# Patient Record
Sex: MALE | Race: WHITE | Employment: UNEMPLOYED | URBAN - NONMETROPOLITAN AREA
[De-identification: names, ages, dates, MRNs, and addresses within clinical notes are randomized per-mention and may not be internally consistent; named-entity substitution may affect disease eponyms.]

---

## 2020-04-06 ENCOUNTER — HOSPITAL ENCOUNTER (EMERGENCY)
Age: 30
Discharge: HOME | End: 2020-04-07
Payer: COMMERCIAL

## 2020-04-06 LAB
ACETAMINOPHEN LEVEL: < 5 UG/ML (ref 0–20)
ALBUMIN SERPL-MCNC: 3.7 G/DL (ref 3.5–5.1)
ALP BLD-CCNC: 118 U/L (ref 38–126)
ALT SERPL-CCNC: 19 U/L (ref 11–66)
AMPHETAMINE+METHAMPHETAMINE URINE SCREEN: POSITIVE
ANION GAP SERPL CALCULATED.3IONS-SCNC: 12 MEQ/L (ref 8–16)
AST SERPL-CCNC: 26 U/L (ref 5–40)
BACTERIA: ABNORMAL /HPF
BARBITURATE QUANTITATIVE URINE: NEGATIVE
BASOPHILS # BLD: 0.2 %
BASOPHILS ABSOLUTE: 0 THOU/MM3 (ref 0–0.1)
BENZODIAZEPINE QUANTITATIVE URINE: NEGATIVE
BILIRUB SERPL-MCNC: 0.2 MG/DL (ref 0.3–1.2)
BILIRUBIN DIRECT: < 0.2 MG/DL (ref 0–0.3)
BILIRUBIN URINE: NEGATIVE
BLOOD, URINE: NEGATIVE
BUN BLDV-MCNC: 15 MG/DL (ref 7–22)
CALCIUM SERPL-MCNC: 8.7 MG/DL (ref 8.5–10.5)
CANNABINOID QUANTITATIVE URINE: POSITIVE
CASTS 2: ABNORMAL /LPF
CASTS UA: ABNORMAL /LPF
CHARACTER, URINE: CLEAR
CHLORIDE BLD-SCNC: 100 MEQ/L (ref 98–111)
CO2: 22 MEQ/L (ref 23–33)
COCAINE METABOLITE QUANTITATIVE URINE: NEGATIVE
COLOR: YELLOW
CREAT SERPL-MCNC: 0.7 MG/DL (ref 0.4–1.2)
CRYSTALS, UA: ABNORMAL
EOSINOPHIL # BLD: 0.2 %
EOSINOPHILS ABSOLUTE: 0 THOU/MM3 (ref 0–0.4)
EPITHELIAL CELLS, UA: ABNORMAL /HPF
ERYTHROCYTE [DISTWIDTH] IN BLOOD BY AUTOMATED COUNT: 12.3 % (ref 11.5–14.5)
ERYTHROCYTE [DISTWIDTH] IN BLOOD BY AUTOMATED COUNT: 42 FL (ref 35–45)
ETHYL ALCOHOL, SERUM: < 0.01 %
GFR SERPL CREATININE-BSD FRML MDRD: > 90 ML/MIN/1.73M2
GLUCOSE BLD-MCNC: 141 MG/DL (ref 70–108)
GLUCOSE URINE: NEGATIVE MG/DL
HCT VFR BLD CALC: 41.4 % (ref 42–52)
HEMOGLOBIN: 13.7 GM/DL (ref 14–18)
IMMATURE GRANS (ABS): 0.04 THOU/MM3 (ref 0–0.07)
IMMATURE GRANULOCYTES: 0.4 %
KETONES, URINE: NEGATIVE
LEUKOCYTE ESTERASE, URINE: ABNORMAL
LYMPHOCYTES # BLD: 8.9 %
LYMPHOCYTES ABSOLUTE: 0.9 THOU/MM3 (ref 1–4.8)
MCH RBC QN AUTO: 30.6 PG (ref 26–33)
MCHC RBC AUTO-ENTMCNC: 33.1 GM/DL (ref 32.2–35.5)
MCV RBC AUTO: 92.6 FL (ref 80–94)
MISCELLANEOUS 2: ABNORMAL
MONOCYTES # BLD: 5.7 %
MONOCYTES ABSOLUTE: 0.6 THOU/MM3 (ref 0.4–1.3)
NITRITE, URINE: NEGATIVE
NUCLEATED RED BLOOD CELLS: 0 /100 WBC
OPIATES, URINE: NEGATIVE
OSMOLALITY CALCULATION: 271.4 MOSMOL/KG (ref 275–300)
OXYCODONE: NEGATIVE
PH UA: 7.5 (ref 5–9)
PHENCYCLIDINE QUANTITATIVE URINE: NEGATIVE
PLATELET # BLD: 289 THOU/MM3 (ref 130–400)
PMV BLD AUTO: 9.5 FL (ref 9.4–12.4)
POTASSIUM SERPL-SCNC: 4.1 MEQ/L (ref 3.5–5.2)
PROTEIN UA: NEGATIVE
RBC # BLD: 4.47 MILL/MM3 (ref 4.7–6.1)
RBC URINE: ABNORMAL /HPF
RENAL EPITHELIAL, UA: ABNORMAL
SALICYLATE, SERUM: < 0.3 MG/DL (ref 2–10)
SEG NEUTROPHILS: 84.6 %
SEGMENTED NEUTROPHILS ABSOLUTE COUNT: 8.6 THOU/MM3 (ref 1.8–7.7)
SODIUM BLD-SCNC: 134 MEQ/L (ref 135–145)
SPECIFIC GRAVITY, URINE: 1.02 (ref 1–1.03)
TOTAL PROTEIN: 6.8 G/DL (ref 6.1–8)
UROBILINOGEN, URINE: 1 EU/DL (ref 0–1)
WBC # BLD: 10.2 THOU/MM3 (ref 4.8–10.8)
WBC UA: ABNORMAL /HPF
YEAST: ABNORMAL

## 2020-04-06 PROCEDURE — 99283 EMERGENCY DEPT VISIT LOW MDM: CPT

## 2020-04-06 PROCEDURE — 84443 ASSAY THYROID STIM HORMONE: CPT

## 2020-04-06 PROCEDURE — 36415 COLL VENOUS BLD VENIPUNCTURE: CPT

## 2020-04-06 PROCEDURE — G0480 DRUG TEST DEF 1-7 CLASSES: HCPCS

## 2020-04-06 PROCEDURE — 82248 BILIRUBIN DIRECT: CPT

## 2020-04-06 PROCEDURE — 80053 COMPREHEN METABOLIC PANEL: CPT

## 2020-04-06 PROCEDURE — 87086 URINE CULTURE/COLONY COUNT: CPT

## 2020-04-06 PROCEDURE — 81001 URINALYSIS AUTO W/SCOPE: CPT

## 2020-04-06 PROCEDURE — 85025 COMPLETE CBC W/AUTO DIFF WBC: CPT

## 2020-04-06 PROCEDURE — 80307 DRUG TEST PRSMV CHEM ANLYZR: CPT

## 2020-04-06 ASSESSMENT — ENCOUNTER SYMPTOMS
ABDOMINAL PAIN: 0
SHORTNESS OF BREATH: 0
SORE THROAT: 0
COUGH: 0
DIARRHEA: 0
RHINORRHEA: 0
VOMITING: 0
BACK PAIN: 0
NAUSEA: 0

## 2020-04-06 ASSESSMENT — PAIN DESCRIPTION - ORIENTATION: ORIENTATION: LEFT;RIGHT

## 2020-04-06 ASSESSMENT — PAIN DESCRIPTION - LOCATION: LOCATION: FOOT

## 2020-04-06 ASSESSMENT — PAIN SCALES - GENERAL: PAINLEVEL_OUTOF10: 6

## 2020-04-07 VITALS
HEART RATE: 85 BPM | DIASTOLIC BLOOD PRESSURE: 73 MMHG | HEIGHT: 72 IN | SYSTOLIC BLOOD PRESSURE: 122 MMHG | TEMPERATURE: 97.5 F | OXYGEN SATURATION: 97 % | BODY MASS INDEX: 24.38 KG/M2 | WEIGHT: 180 LBS | RESPIRATION RATE: 17 BRPM

## 2020-04-07 LAB — TSH SERPL DL<=0.05 MIU/L-ACNC: 1.03 UIU/ML (ref 0.4–4.2)

## 2020-04-07 NOTE — ED NOTES
Pt resting in bed at this time and appears to be resting comfortably. Pt denies needs. Pt respirations easy and unlabored. Mother updated.       Augustine Kapoor RN  04/07/20 0003

## 2020-04-07 NOTE — ED NOTES
[facility] called at this time. [facility] allowed to [facility] to [facility] once patient is medically cleared.       Stefanie Del Angel RN  04/06/20 9664

## 2020-04-07 NOTE — ED NOTES
Pt urine sent to lab at this time. Pt updated on POC. Pt denies further needs.       Monae Wyman RN  04/06/20 3526

## 2020-04-07 NOTE — ED TRIAGE NOTES
Pt to ED from home presenting for medical clearance after going to [facility] after an overdose. Pt states that today around an hour-two hours ago he overdosed on Meth and Heroin, police and squad gave him narcan and he refused medical treatment to the hospital. Pt states he immediately went to [facility]. [facility] sent pt to ED for medical clearance. Pt alert and oriented x4. Pt vital signs stable and respirations unlabored. Pt has prescription for Suboxone but has not taken due to relapsing about three weeks ago. Pt has scabs all over arms due to injections. Pt states he has pain 6/10 on the bottom of his feet due to not wearing proper shoes. Pt denies travel or being around anyone sick. Level C being paged out at this time. Pt denies wanting to harm self or others. Patient placed in safe room that is ligature resistant with continuous monitoring in place. Provider notified, requested an assessment by behavioral health . Patient belongings secured in a locked lockers outside of the room.

## 2020-04-08 LAB — URINE CULTURE REFLEX: NORMAL

## 2020-04-14 ENCOUNTER — HOSPITAL ENCOUNTER (OUTPATIENT)
Age: 30
Discharge: HOME | End: 2020-04-14
Payer: COMMERCIAL

## 2020-04-14 ENCOUNTER — HOSPITAL ENCOUNTER (OUTPATIENT)
Age: 30
Setting detail: SPECIMEN
Discharge: HOME | End: 2020-04-14
Payer: COMMERCIAL

## 2020-04-14 LAB
ALBUMIN SERPL-MCNC: 4.5 G/DL (ref 3.5–5.1)
ALP BLD-CCNC: 118 U/L (ref 38–126)
ALT SERPL-CCNC: 58 U/L (ref 11–66)
AST SERPL-CCNC: 39 U/L (ref 5–40)
BILIRUB SERPL-MCNC: 0.4 MG/DL (ref 0.3–1.2)
BILIRUBIN DIRECT: < 0.2 MG/DL (ref 0–0.3)
HEPATITIS B CORE IGM ANTIBODY: NEGATIVE
HEPATITIS B SURFACE ANTIGEN: NEGATIVE
HEPATITIS C ANTIBODY: NEGATIVE
TOTAL PROTEIN: 7.9 G/DL (ref 6.1–8)

## 2020-04-14 PROCEDURE — 80076 HEPATIC FUNCTION PANEL: CPT

## 2020-04-14 PROCEDURE — 36415 COLL VENOUS BLD VENIPUNCTURE: CPT

## 2020-04-14 PROCEDURE — 86803 HEPATITIS C AB TEST: CPT

## 2020-04-14 PROCEDURE — 86705 HEP B CORE ANTIBODY IGM: CPT

## 2020-04-14 PROCEDURE — 87340 HEPATITIS B SURFACE AG IA: CPT

## 2020-04-14 PROCEDURE — 86592 SYPHILIS TEST NON-TREP QUAL: CPT

## 2020-04-14 PROCEDURE — 86694 HERPES SIMPLEX NES ANTBDY: CPT

## 2020-04-15 LAB
C. TRACHOMATIS DNA ,URINE: NEGATIVE
N. GONORRHOEAE DNA, URINE: NEGATIVE
RPR: NONREACTIVE
SOURCE: NORMAL
SPECIMEN DESCRIPTION: NORMAL
TRICHOMONAS VAGINALI, MOLECULAR: NEGATIVE

## 2020-04-16 LAB — HERPES TYPE I/II IGG COMBINED: 14.4 IV

## 2020-04-17 LAB — HERPES TYPE 1/2 IGM COMBINED: 1.08 IV

## 2020-06-03 ENCOUNTER — TELEPHONE (OUTPATIENT)
Dept: INTERNAL MEDICINE CLINIC | Age: 30
End: 2020-06-03

## 2020-06-03 NOTE — TELEPHONE ENCOUNTER
Patient completed a NP Screening for the [facility]. Dr. Klein Seen has requested to view the patient record from [facility] before he make a decision on a appointment. I called patient and he stated will be coming in the office to sign a release. Release form is at the .

## 2022-01-01 ENCOUNTER — HOSPITAL ENCOUNTER (EMERGENCY)
Age: 32
Discharge: HOME | End: 2022-01-01

## 2022-01-01 VITALS
OXYGEN SATURATION: 95 % | DIASTOLIC BLOOD PRESSURE: 71 MMHG | SYSTOLIC BLOOD PRESSURE: 117 MMHG | TEMPERATURE: 98.3 F | WEIGHT: 200 LBS | BODY MASS INDEX: 27.09 KG/M2 | HEART RATE: 94 BPM | HEIGHT: 72 IN | RESPIRATION RATE: 16 BRPM

## 2022-01-01 DIAGNOSIS — R10.9 NONSPECIFIC ABDOMINAL PAIN: Primary | ICD-10-CM

## 2022-01-01 LAB
ALBUMIN SERPL-MCNC: 5.1 G/DL (ref 3.5–5.1)
ALP BLD-CCNC: 99 U/L (ref 38–126)
ALT SERPL-CCNC: 16 U/L (ref 11–66)
AMPHETAMINE+METHAMPHETAMINE URINE SCREEN: POSITIVE
ANION GAP SERPL CALCULATED.3IONS-SCNC: 14 MEQ/L (ref 8–16)
AST SERPL-CCNC: 37 U/L (ref 5–40)
BACTERIA: ABNORMAL
BARBITURATE QUANTITATIVE URINE: NEGATIVE
BASOPHILS # BLD: 0.1 %
BASOPHILS ABSOLUTE: 0 THOU/MM3 (ref 0–0.1)
BENZODIAZEPINE QUANTITATIVE URINE: NEGATIVE
BILIRUB SERPL-MCNC: 1.1 MG/DL (ref 0.3–1.2)
BILIRUBIN DIRECT: < 0.2 MG/DL (ref 0–0.3)
BILIRUBIN URINE: NEGATIVE
BLOOD, URINE: NEGATIVE
BUN BLDV-MCNC: 17 MG/DL (ref 7–22)
CALCIUM SERPL-MCNC: 9.7 MG/DL (ref 8.5–10.5)
CANNABINOID QUANTITATIVE URINE: POSITIVE
CASTS: ABNORMAL /LPF
CASTS: ABNORMAL /LPF
CHARACTER, URINE: CLEAR
CHLORIDE BLD-SCNC: 103 MEQ/L (ref 98–111)
CO2: 24 MEQ/L (ref 23–33)
COCAINE METABOLITE QUANTITATIVE URINE: NEGATIVE
COLOR: ABNORMAL
CREAT SERPL-MCNC: 1 MG/DL (ref 0.4–1.2)
CRYSTALS: ABNORMAL
EKG ATRIAL RATE: 116 BPM
EKG P AXIS: 59 DEGREES
EKG P-R INTERVAL: 162 MS
EKG Q-T INTERVAL: 334 MS
EKG QRS DURATION: 92 MS
EKG QTC CALCULATION (BAZETT): 464 MS
EKG R AXIS: 22 DEGREES
EKG T AXIS: 47 DEGREES
EKG VENTRICULAR RATE: 116 BPM
EOSINOPHIL # BLD: 0.1 %
EOSINOPHILS ABSOLUTE: 0 THOU/MM3 (ref 0–0.4)
EPITHELIAL CELLS, UA: ABNORMAL /HPF
ERYTHROCYTE [DISTWIDTH] IN BLOOD BY AUTOMATED COUNT: 12.6 % (ref 11.5–14.5)
ERYTHROCYTE [DISTWIDTH] IN BLOOD BY AUTOMATED COUNT: 42.5 FL (ref 35–45)
ETHYL ALCOHOL, SERUM: < 0.01 %
GFR SERPL CREATININE-BSD FRML MDRD: 88 ML/MIN/1.73M2
GLUCOSE BLD-MCNC: 107 MG/DL (ref 70–108)
GLUCOSE, URINE: NEGATIVE MG/DL
HCT VFR BLD CALC: 45.6 % (ref 42–52)
HEMOGLOBIN: 16 GM/DL (ref 14–18)
IMMATURE GRANS (ABS): 0.01 THOU/MM3 (ref 0–0.07)
IMMATURE GRANULOCYTES: 0.1 %
KETONES, URINE: 80
LEUKOCYTE ESTERASE, URINE: NEGATIVE
LIPASE: 54.6 U/L (ref 5.6–51.3)
LYMPHOCYTES # BLD: 21.8 %
LYMPHOCYTES ABSOLUTE: 1.6 THOU/MM3 (ref 1–4.8)
MCH RBC QN AUTO: 32.3 PG (ref 26–33)
MCHC RBC AUTO-ENTMCNC: 35.1 GM/DL (ref 32.2–35.5)
MCV RBC AUTO: 92.1 FL (ref 80–94)
MISCELLANEOUS LAB TEST RESULT: ABNORMAL
MONOCYTES # BLD: 10.7 %
MONOCYTES ABSOLUTE: 0.8 THOU/MM3 (ref 0.4–1.3)
NITRITE, URINE: NEGATIVE
NUCLEATED RED BLOOD CELLS: 0 /100 WBC
OPIATES, URINE: NEGATIVE
OSMOLALITY CALCULATION: 283.3 MOSMOL/KG (ref 275–300)
OXYCODONE: NEGATIVE
PH UA: 5.5 (ref 5–9)
PHENCYCLIDINE QUANTITATIVE URINE: NEGATIVE
PLATELET # BLD: 207 THOU/MM3 (ref 130–400)
PMV BLD AUTO: 9.6 FL (ref 9.4–12.4)
POTASSIUM SERPL-SCNC: 3.3 MEQ/L (ref 3.5–5.2)
PROTEIN UA: 30 MG/DL
RBC # BLD: 4.95 MILL/MM3 (ref 4.7–6.1)
RBC URINE: ABNORMAL /HPF
RENAL EPITHELIAL, UA: ABNORMAL
SEG NEUTROPHILS: 67.2 %
SEGMENTED NEUTROPHILS ABSOLUTE COUNT: 4.9 THOU/MM3 (ref 1.8–7.7)
SODIUM BLD-SCNC: 141 MEQ/L (ref 135–145)
SPECIFIC GRAVITY UA: > 1.03 (ref 1–1.03)
TOTAL PROTEIN: 7.8 G/DL (ref 6.1–8)
UROBILINOGEN, URINE: 1 EU/DL (ref 0–1)
WBC # BLD: 7.3 THOU/MM3 (ref 4.8–10.8)
WBC UA: ABNORMAL /HPF
YEAST: ABNORMAL

## 2022-01-01 PROCEDURE — 82077 ASSAY SPEC XCP UR&BREATH IA: CPT

## 2022-01-01 PROCEDURE — 2580000003 HC RX 258: Performed by: NURSE PRACTITIONER

## 2022-01-01 PROCEDURE — 96360 HYDRATION IV INFUSION INIT: CPT

## 2022-01-01 PROCEDURE — 99284 EMERGENCY DEPT VISIT MOD MDM: CPT

## 2022-01-01 PROCEDURE — 80307 DRUG TEST PRSMV CHEM ANLYZR: CPT

## 2022-01-01 PROCEDURE — 83690 ASSAY OF LIPASE: CPT

## 2022-01-01 PROCEDURE — 81001 URINALYSIS AUTO W/SCOPE: CPT

## 2022-01-01 PROCEDURE — 93005 ELECTROCARDIOGRAM TRACING: CPT | Performed by: NURSE PRACTITIONER

## 2022-01-01 PROCEDURE — 85025 COMPLETE CBC W/AUTO DIFF WBC: CPT

## 2022-01-01 PROCEDURE — 82248 BILIRUBIN DIRECT: CPT

## 2022-01-01 PROCEDURE — 80053 COMPREHEN METABOLIC PANEL: CPT

## 2022-01-01 PROCEDURE — 6370000000 HC RX 637 (ALT 250 FOR IP): Performed by: NURSE PRACTITIONER

## 2022-01-01 RX ORDER — OMEPRAZOLE 20 MG/1
20 CAPSULE, DELAYED RELEASE ORAL
Qty: 30 CAPSULE | Refills: 0 | Status: ON HOLD | OUTPATIENT
Start: 2022-01-01 | End: 2022-01-07 | Stop reason: HOSPADM

## 2022-01-01 RX ORDER — PANTOPRAZOLE SODIUM 40 MG/1
40 TABLET, DELAYED RELEASE ORAL ONCE
Status: COMPLETED | OUTPATIENT
Start: 2022-01-01 | End: 2022-01-01

## 2022-01-01 RX ORDER — 0.9 % SODIUM CHLORIDE 0.9 %
1000 INTRAVENOUS SOLUTION INTRAVENOUS ONCE
Status: COMPLETED | OUTPATIENT
Start: 2022-01-01 | End: 2022-01-01

## 2022-01-01 RX ADMIN — SODIUM CHLORIDE 1000 ML: 9 INJECTION, SOLUTION INTRAVENOUS at 07:27

## 2022-01-01 RX ADMIN — PANTOPRAZOLE SODIUM 40 MG: 40 TABLET, DELAYED RELEASE ORAL at 07:22

## 2022-01-01 RX ADMIN — Medication: at 07:22

## 2022-01-01 ASSESSMENT — ENCOUNTER SYMPTOMS
COLOR CHANGE: 0
VOMITING: 0
NAUSEA: 0
SHORTNESS OF BREATH: 0
DIARRHEA: 0
COUGH: 0
ABDOMINAL DISTENTION: 0
ABDOMINAL PAIN: 1
CONSTIPATION: 1
CHEST TIGHTNESS: 0

## 2022-01-01 NOTE — ED NOTES
ED nurse-to-nurse bedside report    Chief Complaint   Patient presents with    Abdominal Pain      LOC: alert and orientated to name, place, date  Vital signs   Vitals:    01/01/22 0655   BP: 122/85   Pulse: 110   Resp: 16   Temp: 98.3 °F (36.8 °C)   TempSrc: Oral   SpO2: 98%   Weight: 200 lb (90.7 kg)   Height: 6' (1.829 m)      Pain:    Pain Interventions: none  Pain Goal: 2  Oxygen: No    Current needs required none   Telemetry: Yes  LDAs:   Peripheral IV 01/01/22 Right Antecubital (Active)   Site Assessment Clean;Dry; Intact 01/01/22 0700   Line Status Flushed;Specimen collected 01/01/22 0700   Dressing Status Clean; Intact;Dry 01/01/22 0700     Continuous Infusions:   Mobility: Independent  Lucia Fall Risk Score: No flowsheet data found.   Fall Interventions: side rails raised  Report given to: Rosita Foster, 43 Valerie Patiño RN  01/01/22 3539

## 2022-01-01 NOTE — ED TRIAGE NOTES
Pt presents to the ED ambulatory through triage c/o abdominal pain that has been intermittently ongoing for approx 6 months. Pt states that he just recently was released from care home 3 days ago and wanted to be evaluated today. Pt rates his pain a 7/10 and states that eating greasy foods makes it worse. Pt is alert and oriented, respirations are equal and unlabored.  Pt does not appear to be in any distress at this time

## 2022-01-01 NOTE — ED PROVIDER NOTES
[facility] Emergency Department    CHIEF COMPLAINT       Chief Complaint   Patient presents with    Abdominal Pain       Nurses Notes reviewed and I agree except as noted in the HPI. HISTORY OF PRESENT ILLNESS    Dario Alavrez is a 36 y.o. male who presents to the ED for evaluation of abdominal pain. Patient notes pain has been ongoing for the last 6 months. He notes it is generalized, sometimes in the upper abdomen sometimes in the lower abdomen, it is intermittent, he describes it as sharp at times. He also describes it as dull at times. Currently rates it a 7 out of 10. He notes some intermittent constipation, denies nausea or vomiting, denies any change in urination. He notes worsening with eating greasy foods. He notes he has not come for medical treatment because he recently was released from skilled nursing 3 days ago. He notes history of hernia surgery in the left inguinal area. Denies any other abdominal surgeries in the past.  Denies any other significant medical history. Per electronic medical record he has a history of methamphetamine abuse. HPI was provided by the patient. REVIEW OF SYSTEMS     Review of Systems   Constitutional: Negative for activity change, chills, fatigue and fever. Respiratory: Negative for cough, chest tightness and shortness of breath. Cardiovascular: Negative for chest pain. Gastrointestinal: Positive for abdominal pain and constipation. Negative for abdominal distention, diarrhea, nausea and vomiting. Genitourinary: Negative for decreased urine volume, difficulty urinating, dysuria, flank pain and frequency. Skin: Negative for color change and rash. Allergic/Immunologic: Negative for immunocompromised state. Neurological: Negative for dizziness, weakness, light-headedness and headaches. Hematological: Does not bruise/bleed easily. Psychiatric/Behavioral: Negative for agitation, behavioral problems and confusion.         PAST MEDICAL HISTORY   No past medical history on file. SURGICALHISTORY      has no past surgical history on file. CURRENT MEDICATIONS       Discharge Medication List as of 1/1/2022 11:53 AM          ALLERGIES     has No Known Allergies. FAMILY HISTORY     has no family status information on file. family history is not on file. SOCIAL HISTORY       Social History     Socioeconomic History    Marital status: Single     Spouse name: Not on file    Number of children: Not on file    Years of education: Not on file    Highest education level: Not on file   Occupational History    Not on file   Tobacco Use    Smoking status: Not on file    Smokeless tobacco: Never Used   Substance and Sexual Activity    Alcohol use: Not Currently    Drug use: Yes     Types: Methamphetamines (Crystal Meth)     Comment: Meth and heroin today    Sexual activity: Not on file   Other Topics Concern    Not on file   Social History Narrative    Not on file     Social Determinants of Health     Financial Resource Strain:     Difficulty of Paying Living Expenses: Not on file   Food Insecurity:     Worried About Running Out of Food in the Last Year: Not on file    Junior of Food in the Last Year: Not on file   Transportation Needs:     Lack of Transportation (Medical): Not on file    Lack of Transportation (Non-Medical):  Not on file   Physical Activity:     Days of Exercise per Week: Not on file    Minutes of Exercise per Session: Not on file   Stress:     Feeling of Stress : Not on file   Social Connections:     Frequency of Communication with Friends and Family: Not on file    Frequency of Social Gatherings with Friends and Family: Not on file    Attends Yarsani Services: Not on file    Active Member of Clubs or Organizations: Not on file    Attends Club or Organization Meetings: Not on file    Marital Status: Not on file   Intimate Partner Violence:     Fear of Current or Ex-Partner: Not on file    Emotionally Abused: Not on file    Physically Abused: Not on file    Sexually Abused: Not on file   Housing Stability:     Unable to Pay for Housing in the Last Year: Not on file    Number of Places Lived in the Last Year: Not on file    Unstable Housing in the Last Year: Not on file       PHYSICAL EXAM     INITIAL VITALS:  height is 6' (1.829 m) and weight is 200 lb (90.7 kg). His oral temperature is 98.3 °F (36.8 °C). His blood pressure is 117/71 and his pulse is 94. His respiration is 16 and oxygen saturation is 95%. Physical Exam  Vitals and nursing note reviewed. Constitutional:       Appearance: Normal appearance. He is well-developed. HENT:      Head: Normocephalic. Right Ear: External ear normal.      Left Ear: External ear normal.      Nose: Nose normal.      Mouth/Throat:      Pharynx: Uvula midline. Eyes:      Conjunctiva/sclera: Conjunctivae normal.   Cardiovascular:      Rate and Rhythm: Normal rate and regular rhythm. Heart sounds: Normal heart sounds, S1 normal and S2 normal.   Pulmonary:      Effort: Pulmonary effort is normal. No respiratory distress. Breath sounds: Normal breath sounds. Chest:      Chest wall: No tenderness. Abdominal:      General: Bowel sounds are normal. There is no distension. Palpations: Abdomen is soft. Tenderness: There is no abdominal tenderness. Negative signs include Braswell's sign and McBurney's sign. Hernia: No hernia is present. Musculoskeletal:         General: Normal range of motion. Cervical back: Normal range of motion and neck supple. Skin:     General: Skin is warm and dry. Coloration: Skin is not pale. Findings: No erythema or rash. Neurological:      Mental Status: He is alert and oriented to person, place, and time. Psychiatric:         Behavior: Behavior normal.         Thought Content:  Thought content normal.         Judgment: Judgment normal.         DIFFERENTIAL DIAGNOSIS:   Dyspepsia, peptic ulcer disease, cholelithiasis, cholecystitis, constipation, IBS,  DIAGNOSTIC RESULTS     EKG: All EKG's are interpreted by the Emergency Department Physician who eithersigns or Co-signs this chart in the absence of a cardiologist.    EKG read and interpreted by myself gives impression of sinus tachycardia with heart rate of 116; interval 162; QRS 92;QTc 464; axis P-59, R-22, T-47. RADIOLOGY: non-plainfilm images(s) such as CT, Ultrasound and MRI are read by the radiologist.  Plain radiographic images are visualized and preliminarily interpreted by the emergency physician unless otherwise stated below. No orders to display         LABS:   Labs Reviewed   BASIC METABOLIC PANEL - Abnormal; Notable for the following components:       Result Value    Potassium 3.3 (*)     All other components within normal limits   LIPASE - Abnormal; Notable for the following components:    Lipase 54.6 (*)     All other components within normal limits   URINALYSIS WITH MICROSCOPIC - Abnormal; Notable for the following components:    Ketones, Urine 80 (*)     Specific Gravity, UA >1.030 (*)     Protein, UA 30 (*)     All other components within normal limits   GLOMERULAR FILTRATION RATE, ESTIMATED - Abnormal; Notable for the following components:    Est, Glom Filt Rate 88 (*)     All other components within normal limits   CBC WITH AUTO DIFFERENTIAL   HEPATIC FUNCTION PANEL   URINE DRUG SCREEN   ETHANOL   ANION GAP   OSMOLALITY       EMERGENCY DEPARTMENT COURSE:   Vitals:    Vitals:    01/01/22 0655 01/01/22 0846 01/01/22 0931 01/01/22 1001   BP: 122/85 125/80 114/70 117/71   Pulse: 110 103 97 94   Resp: 16      Temp: 98.3 °F (36.8 °C)      TempSrc: Oral      SpO2: 98% 100% 97% 95%   Weight: 200 lb (90.7 kg)      Height: 6' (1.829 m)          MDM  Patient was seen and evaluated in the emergency department, patient appeared to be in no acute distress, vital signs reviewed, tachycardia noted.   Physical exam was completed, some generalized abdominal tenderness, negative Braswell sign, no hernia noted, no other significant findings noted. Labs were ordered liver enzymes were normal, no electrolyte abnormality noted, no anemia noted, urine is positive for amphetamines and cannabis. Patient was treated with medications below and I noted improvement in symptoms. Discussed my findings and plan of care the patient is amenable with discharge. Follow-up appointment with family medicine pain the next 3 days. He is advised to take omeprazole, return to the ER with worsening symptoms. He verbalized understanding. Medications   0.9 % sodium chloride bolus (0 mLs IntraVENous Stopped 1/1/22 5264)   aluminum & magnesium hydroxide-simethicone (MAALOX) 30 mL, lidocaine viscous hcl (XYLOCAINE) 5 mL (GI COCKTAIL) ( Oral Given 1/1/22 8205)   pantoprazole (PROTONIX) tablet 40 mg (40 mg Oral Given 1/1/22 4014)       Patient was seenindependently by myself. The patient's final impression and disposition and plan was determined by myself. CRITICAL CARE:   None    CONSULTS:  None    PROCEDURES:  None    FINAL IMPRESSION     1. Nonspecific abdominal pain          DISPOSITION/PLAN   Patient discharged in stable condition    PATIENT REFERREDTO:  [facility] [address],[address]  [facility]. [address] [facility] [address] [facility]  Go on 1/3/2022  appointment at 240pm      DISCHARGE MEDICATIONS:  Discharge Medication List as of 1/1/2022 11:53 AM      START taking these medications    Details   omeprazole (PRILOSEC) 20 MG delayed release capsule Take 1 capsule by mouth every morning (before breakfast), Disp-30 capsule, R-0Print             (Please note that portions of this note were completed with a voice recognition program.  Efforts were made to edit the dictations but occasionally words are mis-transcribed.)      Provider:  I personally performed the services described in the documentation,reviewed and edited the documentation which was dictated to the scribe in my presence, and it accurately records my words and actions.     Lino Burgos, CNP 01/01/22 7:11 PM    Aroldo Burgos, APRN - CNP        Artsicle, APRN - CNP  01/01/22 1911

## 2022-01-02 ENCOUNTER — HOSPITAL ENCOUNTER (EMERGENCY)
Age: 32
Discharge: HOME | End: 2022-01-03

## 2022-01-02 VITALS
TEMPERATURE: 98.9 F | SYSTOLIC BLOOD PRESSURE: 119 MMHG | HEART RATE: 121 BPM | BODY MASS INDEX: 24.41 KG/M2 | DIASTOLIC BLOOD PRESSURE: 88 MMHG | OXYGEN SATURATION: 97 % | WEIGHT: 180 LBS | RESPIRATION RATE: 19 BRPM

## 2022-01-02 DIAGNOSIS — F19.94 SUBSTANCE INDUCED MOOD DISORDER (HCC): ICD-10-CM

## 2022-01-02 DIAGNOSIS — F15.10 METHAMPHETAMINE ABUSE (HCC): Primary | ICD-10-CM

## 2022-01-02 PROCEDURE — 36415 COLL VENOUS BLD VENIPUNCTURE: CPT

## 2022-01-02 PROCEDURE — 84443 ASSAY THYROID STIM HORMONE: CPT

## 2022-01-02 PROCEDURE — 85025 COMPLETE CBC W/AUTO DIFF WBC: CPT

## 2022-01-02 PROCEDURE — 80179 DRUG ASSAY SALICYLATE: CPT

## 2022-01-02 PROCEDURE — 82248 BILIRUBIN DIRECT: CPT

## 2022-01-02 PROCEDURE — 99284 EMERGENCY DEPT VISIT MOD MDM: CPT

## 2022-01-02 PROCEDURE — 82077 ASSAY SPEC XCP UR&BREATH IA: CPT

## 2022-01-02 PROCEDURE — 80053 COMPREHEN METABOLIC PANEL: CPT

## 2022-01-02 PROCEDURE — 80143 DRUG ASSAY ACETAMINOPHEN: CPT

## 2022-01-03 ENCOUNTER — HOSPITAL ENCOUNTER (INPATIENT)
Age: 32
LOS: 3 days | Discharge: HOME | DRG: 751 | End: 2022-01-07
Attending: EMERGENCY MEDICINE | Admitting: PSYCHIATRY & NEUROLOGY
Payer: MEDICAID

## 2022-01-03 DIAGNOSIS — R45.851 SUICIDAL THOUGHTS: Primary | ICD-10-CM

## 2022-01-03 DIAGNOSIS — F15.10 METHAMPHETAMINE ABUSE (HCC): ICD-10-CM

## 2022-01-03 LAB
ACETAMINOPHEN LEVEL: < 5 UG/ML (ref 0–20)
ALBUMIN SERPL-MCNC: 4.6 G/DL (ref 3.5–5.1)
ALP BLD-CCNC: 93 U/L (ref 38–126)
ALT SERPL-CCNC: 18 U/L (ref 11–66)
AMPHETAMINE+METHAMPHETAMINE URINE SCREEN: POSITIVE
ANION GAP SERPL CALCULATED.3IONS-SCNC: 14 MEQ/L (ref 8–16)
AST SERPL-CCNC: 29 U/L (ref 5–40)
BARBITURATE QUANTITATIVE URINE: NEGATIVE
BASOPHILS # BLD: 0.4 %
BASOPHILS # BLD: 0.5 %
BASOPHILS ABSOLUTE: 0 THOU/MM3 (ref 0–0.1)
BASOPHILS ABSOLUTE: 0 THOU/MM3 (ref 0–0.1)
BENZODIAZEPINE QUANTITATIVE URINE: NEGATIVE
BILIRUB SERPL-MCNC: 0.5 MG/DL (ref 0.3–1.2)
BILIRUBIN DIRECT: < 0.2 MG/DL (ref 0–0.3)
BUN BLDV-MCNC: 11 MG/DL (ref 7–22)
CALCIUM SERPL-MCNC: 9.3 MG/DL (ref 8.5–10.5)
CANNABINOID QUANTITATIVE URINE: POSITIVE
CHLORIDE BLD-SCNC: 104 MEQ/L (ref 98–111)
CO2: 23 MEQ/L (ref 23–33)
COCAINE METABOLITE QUANTITATIVE URINE: NEGATIVE
CREAT SERPL-MCNC: 0.9 MG/DL (ref 0.4–1.2)
EOSINOPHIL # BLD: 0.2 %
EOSINOPHIL # BLD: 2.2 %
EOSINOPHILS ABSOLUTE: 0 THOU/MM3 (ref 0–0.4)
EOSINOPHILS ABSOLUTE: 0.1 THOU/MM3 (ref 0–0.4)
ERYTHROCYTE [DISTWIDTH] IN BLOOD BY AUTOMATED COUNT: 12 % (ref 11.5–14.5)
ERYTHROCYTE [DISTWIDTH] IN BLOOD BY AUTOMATED COUNT: 12.1 % (ref 11.5–14.5)
ERYTHROCYTE [DISTWIDTH] IN BLOOD BY AUTOMATED COUNT: 41.1 FL (ref 35–45)
ERYTHROCYTE [DISTWIDTH] IN BLOOD BY AUTOMATED COUNT: 41.7 FL (ref 35–45)
ETHYL ALCOHOL, SERUM: < 0.01 %
GFR SERPL CREATININE-BSD FRML MDRD: > 90 ML/MIN/1.73M2
GLUCOSE BLD-MCNC: 113 MG/DL (ref 70–108)
HCT VFR BLD CALC: 45.5 % (ref 42–52)
HCT VFR BLD CALC: 47 % (ref 42–52)
HEMOGLOBIN: 15.6 GM/DL (ref 14–18)
HEMOGLOBIN: 16.1 GM/DL (ref 14–18)
IMMATURE GRANS (ABS): 0.01 THOU/MM3 (ref 0–0.07)
IMMATURE GRANS (ABS): 0.02 THOU/MM3 (ref 0–0.07)
IMMATURE GRANULOCYTES: 0.2 %
IMMATURE GRANULOCYTES: 0.3 %
LYMPHOCYTES # BLD: 20.4 %
LYMPHOCYTES # BLD: 23.8 %
LYMPHOCYTES ABSOLUTE: 1.1 THOU/MM3 (ref 1–4.8)
LYMPHOCYTES ABSOLUTE: 1.4 THOU/MM3 (ref 1–4.8)
MCH RBC QN AUTO: 31.8 PG (ref 26–33)
MCH RBC QN AUTO: 32.2 PG (ref 26–33)
MCHC RBC AUTO-ENTMCNC: 34.3 GM/DL (ref 32.2–35.5)
MCHC RBC AUTO-ENTMCNC: 34.3 GM/DL (ref 32.2–35.5)
MCV RBC AUTO: 92.9 FL (ref 80–94)
MCV RBC AUTO: 93.8 FL (ref 80–94)
MONOCYTES # BLD: 11.1 %
MONOCYTES # BLD: 8.2 %
MONOCYTES ABSOLUTE: 0.5 THOU/MM3 (ref 0.4–1.3)
MONOCYTES ABSOLUTE: 0.6 THOU/MM3 (ref 0.4–1.3)
NUCLEATED RED BLOOD CELLS: 0 /100 WBC
NUCLEATED RED BLOOD CELLS: 0 /100 WBC
OPIATES, URINE: NEGATIVE
OSMOLALITY CALCULATION: 281.5 MOSMOL/KG (ref 275–300)
OXYCODONE: NEGATIVE
PHENCYCLIDINE QUANTITATIVE URINE: NEGATIVE
PLATELET # BLD: 217 THOU/MM3 (ref 130–400)
PLATELET # BLD: 254 THOU/MM3 (ref 130–400)
PMV BLD AUTO: 9.7 FL (ref 9.4–12.4)
PMV BLD AUTO: 9.8 FL (ref 9.4–12.4)
POTASSIUM SERPL-SCNC: 3.3 MEQ/L (ref 3.5–5.2)
RBC # BLD: 4.85 MILL/MM3 (ref 4.7–6.1)
RBC # BLD: 5.06 MILL/MM3 (ref 4.7–6.1)
SALICYLATE, SERUM: < 0.3 MG/DL (ref 2–10)
SEG NEUTROPHILS: 62.1 %
SEG NEUTROPHILS: 70.6 %
SEGMENTED NEUTROPHILS ABSOLUTE COUNT: 3.6 THOU/MM3 (ref 1.8–7.7)
SEGMENTED NEUTROPHILS ABSOLUTE COUNT: 4 THOU/MM3 (ref 1.8–7.7)
SODIUM BLD-SCNC: 141 MEQ/L (ref 135–145)
TOTAL PROTEIN: 7.2 G/DL (ref 6.1–8)
TSH SERPL DL<=0.05 MIU/L-ACNC: 1.56 UIU/ML (ref 0.4–4.2)
WBC # BLD: 5.6 THOU/MM3 (ref 4.8–10.8)
WBC # BLD: 5.8 THOU/MM3 (ref 4.8–10.8)

## 2022-01-03 PROCEDURE — 80179 DRUG ASSAY SALICYLATE: CPT

## 2022-01-03 PROCEDURE — 99283 EMERGENCY DEPT VISIT LOW MDM: CPT

## 2022-01-03 PROCEDURE — 80143 DRUG ASSAY ACETAMINOPHEN: CPT

## 2022-01-03 PROCEDURE — 81001 URINALYSIS AUTO W/SCOPE: CPT

## 2022-01-03 PROCEDURE — 84443 ASSAY THYROID STIM HORMONE: CPT

## 2022-01-03 PROCEDURE — 83690 ASSAY OF LIPASE: CPT

## 2022-01-03 PROCEDURE — 82248 BILIRUBIN DIRECT: CPT

## 2022-01-03 PROCEDURE — 83735 ASSAY OF MAGNESIUM: CPT

## 2022-01-03 PROCEDURE — 36415 COLL VENOUS BLD VENIPUNCTURE: CPT

## 2022-01-03 PROCEDURE — 82077 ASSAY SPEC XCP UR&BREATH IA: CPT

## 2022-01-03 PROCEDURE — 80053 COMPREHEN METABOLIC PANEL: CPT

## 2022-01-03 PROCEDURE — 80307 DRUG TEST PRSMV CHEM ANLYZR: CPT

## 2022-01-03 PROCEDURE — 85025 COMPLETE CBC W/AUTO DIFF WBC: CPT

## 2022-01-03 ASSESSMENT — ENCOUNTER SYMPTOMS
RHINORRHEA: 0
EYE DISCHARGE: 0
DIARRHEA: 0
EYE REDNESS: 0
ABDOMINAL DISTENTION: 0
NAUSEA: 0
EYE ITCHING: 0
PHOTOPHOBIA: 0
COUGH: 0
VOICE CHANGE: 0
BACK PAIN: 0
TROUBLE SWALLOWING: 0
WHEEZING: 0
BACK PAIN: 0
VOMITING: 0
ABDOMINAL PAIN: 0
RHINORRHEA: 0
COUGH: 0
VOMITING: 0
ABDOMINAL PAIN: 0
CHOKING: 0
EYE REDNESS: 0
SORE THROAT: 0
EYE PAIN: 0
CHEST TIGHTNESS: 0
SHORTNESS OF BREATH: 0
NAUSEA: 0
SINUS PRESSURE: 0
CHEST TIGHTNESS: 0
CONSTIPATION: 0
BLOOD IN STOOL: 0

## 2022-01-03 NOTE — ED PROVIDER NOTES
Spouse name: Not on file    Number of children: Not on file    Years of education: Not on file    Highest education level: Not on file   Occupational History    Not on file   Tobacco Use    Smoking status: Not on file    Smokeless tobacco: Never Used   Substance and Sexual Activity    Alcohol use: Not Currently    Drug use: Yes     Types: Methamphetamines (Crystal Meth)     Comment: Meth and heroin today    Sexual activity: Not on file   Other Topics Concern    Not on file   Social History Narrative    Not on file     Social Determinants of Health     Financial Resource Strain:     Difficulty of Paying Living Expenses: Not on file   Food Insecurity:     Worried About Running Out of Food in the Last Year: Not on file    Junior of Food in the Last Year: Not on file   Transportation Needs:     Lack of Transportation (Medical): Not on file    Lack of Transportation (Non-Medical): Not on file   Physical Activity:     Days of Exercise per Week: Not on file    Minutes of Exercise per Session: Not on file   Stress:     Feeling of Stress : Not on file   Social Connections:     Frequency of Communication with Friends and Family: Not on file    Frequency of Social Gatherings with Friends and Family: Not on file    Attends Sikhism Services: Not on file    Active Member of [address] [place] [address] [employer] or Organizations: Not on file    Attends Club or Organization Meetings: Not on file    Marital Status: Not on file   Intimate Partner Violence:     Fear of Current or Ex-Partner: Not on file    Emotionally Abused: Not on file    Physically Abused: Not on file    Sexually Abused: Not on file   Housing Stability:     Unable to Pay for Housing in the Last Year: Not on file    Number of [place] in the Last Year: Not on file    Unstable Housing in the Last Year: Not on file       PHYSICAL EXAM     INITIAL VITALS:  weight is 180 lb (81.6 kg). His temperature is 98.9 °F (37.2 °C).  His blood pressure is 119/88 and his pulse is 121. His respiration is 19 and oxygen saturation is 97%. Physical Exam  Constitutional:       Appearance: Normal appearance. He is well-developed. He is not ill-appearing. HENT:      Head: Normocephalic and atraumatic. Nose: Nose normal.      Mouth/Throat:      Mouth: Mucous membranes are moist.      Pharynx: Oropharynx is clear. Eyes:      Conjunctiva/sclera: Conjunctivae normal.   Cardiovascular:      Rate and Rhythm: Normal rate. Pulses: Normal pulses. Pulmonary:      Effort: Pulmonary effort is normal.   Abdominal:      Palpations: Abdomen is soft. Musculoskeletal:         General: Normal range of motion. Cervical back: Normal range of motion. Skin:     General: Skin is warm and dry. Capillary Refill: Capillary refill takes less than 2 seconds. Neurological:      General: No focal deficit present. Mental Status: He is alert and oriented to person, place, and time. Psychiatric:         Attention and Perception: He is inattentive. He perceives auditory hallucinations. Behavior: Behavior normal.         Thought Content: Thought content is paranoid. Thought content includes suicidal ideation. Thought content does not include homicidal ideation. Thought content does not include homicidal or suicidal plan. Judgment: Judgment is impulsive. DIFFERENTIAL DIAGNOSIS:   Substance use, paranoia, hallucinations    DIAGNOSTIC RESULTS     EKG: All EKG's are interpreted by the Emergency Department Physician who eithersigns or Co-signs this chart in the absence of a cardiologist.        RADIOLOGY: non-plainfilm images(s) such as CT, Ultrasound and MRI are read by the radiologist.  Plain radiographic images are visualized and preliminarily interpreted by the emergency physician unless otherwise stated below.   No orders to display         LABS:   [address] - Abnormal; Notable for the following components:       Result Value Potassium 3.3 (*)     Glucose 113 (*)     All other components within normal limits   SALICYLATE LEVEL - Abnormal; Notable for the following components:    Salicylate, Serum < 0.3 (*)     All other components within normal limits   ACETAMINOPHEN LEVEL   CBC WITH AUTO DIFFERENTIAL   ETHANOL   HEPATIC FUNCTION PANEL   TSH WITHOUT REFLEX   GLOMERULAR FILTRATION RATE, ESTIMATED   OSMOLALITY   ANION GAP   URINE DRUG SCREEN   URINE RT REFLEX TO CULTURE       EMERGENCY DEPARTMENT COURSE:   Vitals:    Vitals:    01/02/22 2342   BP: 119/88   Pulse: 121   Resp: 19   Temp: 98.9 °F (37.2 °C)   SpO2: 97%   Weight: 180 lb (81.6 kg)                             MDM    The patient was seen and evaluated within the ED today for the evaluation of suicidal ideation. Physical exam revealed no significant abnormalities or concerns. I completed a medical evaluation of the patient and ordered appropriate labs which were unremarkable. WILTON and social work completed a full psychiatric evaluation of the patient and determined that he met  discharge criteria. I medically cleared the patient. WILTON and social work's noted should be consulted for the psychiatric evaluation and reason for discharge. Medications - No data to display    Please note that the patient was evaluated during a pandemic. All efforts were made for HIPPA compliance as well as provision of appropriate care. Patient was seen independently by myself. The patient's final impression and disposition and plan was determined by myself. Strict return precautions and follow up instructions were discussed with the patient prior to discharge, with which the patient agrees. Physical assessment findings, diagnostic testing(s) if applicable, and vital signs reviewed with patient/patient representative. Questions answered. Medications asdirected, including OTC medications for supportive care. Education provided on medications.   Differential diagnosis(s) discussed with patient/patient representative. Home care/self care instructions reviewed withpatient/patient representative. Patient is to follow-up with family care provider in 2-3 days if no improvement. Patient is to go to the emergency department if symptoms worsen. Patient/patient representative isaware of care plan, questions answered, verbalizes understanding and is in agreement. CRITICAL CARE:   None    CONSULTS:  WILTON    PROCEDURES:  None    FINAL IMPRESSION     1. Methamphetamine abuse ([facility] [address]    2. Substance induced mood disorder [facility])          DISPOSITION/PLAN   DISPOSITION Decision To Discharge 01/03/2022 02:10:55 AM      PATIENT REFERREDTO:  [facility]  [address] [facility] [address]  [phone]    Call in 1 day  For follow up      DISCHARGE MEDICATIONS:  Discharge Medication List as of 1/3/2022  2:12 AM          (Please note that portions of this note were completed with a voice recognition program.  Efforts were made to edit the dictations but occasionally words are mis-transcribed.)         CALDERON Mcknight - CALDERON Francisco CNP  01/03/22 9549

## 2022-01-03 NOTE — ED TRIAGE NOTES
Patient presents to ED with chief complaint of Suicidal Ideation. Patient states that the house he is living in has drugs, and it makes him want to do them. Patient states that this makes him very depressed, and causes him to have suicidal thoughts. Patient states that he does not have a plan, but has cut himself in the past. Patient placed in ligature resistant room under constant supervision. Patient resting in bed. Respirations easy and unlabored. No distress noted. Call light within reach.

## 2022-01-03 NOTE — PROGRESS NOTES
Chief Complaint:    Suicidal       Provisional Diagnosis:  Unspecified depressive       Risk, Psychosocial and Contextual Factors: (homeless, lack of social support etc.): released from [facility]       Current MH Treatment: denies       Present Suicidal Behavior:    Verbal: yes, no plan    Attempt: denies      Access to Weapons: denies      C-SSRS Current Suicide Risk: Low, Moderate or High:    denies      Past Suicidal Behavior:    Verbal: yes    Attempts: yes, cutting left       Self-Injurious/Self-Mutilation: (Specify) hx cutting       Traumatic Event Within Past 2 Weeks: (Specify) denies      Current Abuse:  (Specify) denies      Legal: (Specify) released from [facility] 3 days ago       Violence: (Specify) denies      Protective Factors:  Stable housing      [address] [place] [address] Summary:    Pt is a 36year old male who presents to the ED voluntarily with [employer]. Police were called to [facility] d/t pt making suicidal statements. He reports feeling suicidal for the past 3 years, but has gotten more intense in the past 3 days since he was released from long-term. Pt states that he resides at the [facility] and since he has been there he has been hearing voices telling him to kill himself. Pt denies having plan but feels scared he would act. Pt has a hx attempt in 2017 by cutting his left arm. No hx hospitalization. Pt denies current outpatient services. He states that he went to [facility] on Friday d/t suicidal ideation, but did not stay because he was afraid he would be considered a runaway from the [facility]. Pt states that he feels depressed, helpless and has little appetite. He reports that since being at the [address] [place] [address] he has been worse. Reports auditory command hallucinations. Denies homicidal ideation. Denies alcohol use. Reports methamphetamine use \"a few days ago. \"        Level of Care Disposition:      Consulted with medical provider. Patient is medically stabilized.   Consulted with patients RN about abnormalities or medical concerns. No abnormalities or medical concerns noted. 0132: Consulted with Dr Radha Taylor. Patient to be discharged back to [facility].  Informed medical provider

## 2022-01-04 PROBLEM — F23 ACUTE PSYCHOSIS (HCC): Status: ACTIVE | Noted: 2022-01-04

## 2022-01-04 PROBLEM — F33.9 MAJOR DEPRESSIVE DISORDER, RECURRENT (HCC): Status: ACTIVE | Noted: 2022-01-04

## 2022-01-04 LAB
ACETAMINOPHEN LEVEL: < 5 UG/ML (ref 0–20)
ALBUMIN SERPL-MCNC: 5 G/DL (ref 3.5–5.1)
ALP BLD-CCNC: 101 U/L (ref 38–126)
ALT SERPL-CCNC: 19 U/L (ref 11–66)
ANION GAP SERPL CALCULATED.3IONS-SCNC: 14 MEQ/L (ref 8–16)
AST SERPL-CCNC: 28 U/L (ref 5–40)
BACTERIA: ABNORMAL /HPF
BILIRUB SERPL-MCNC: 0.7 MG/DL (ref 0.3–1.2)
BILIRUBIN DIRECT: < 0.2 MG/DL (ref 0–0.3)
BILIRUBIN URINE: NEGATIVE
BLOOD, URINE: NEGATIVE
BUN BLDV-MCNC: 9 MG/DL (ref 7–22)
CALCIUM SERPL-MCNC: 9.8 MG/DL (ref 8.5–10.5)
CASTS 2: ABNORMAL /LPF
CASTS UA: ABNORMAL /LPF
CHARACTER, URINE: CLEAR
CHLORIDE BLD-SCNC: 101 MEQ/L (ref 98–111)
CO2: 26 MEQ/L (ref 23–33)
COLOR: ABNORMAL
CREAT SERPL-MCNC: 0.9 MG/DL (ref 0.4–1.2)
CRYSTALS, UA: ABNORMAL
EPITHELIAL CELLS, UA: ABNORMAL /HPF
ETHYL ALCOHOL, SERUM: < 0.01 %
GFR SERPL CREATININE-BSD FRML MDRD: > 90 ML/MIN/1.73M2
GLUCOSE BLD-MCNC: 105 MG/DL (ref 70–108)
GLUCOSE URINE: NEGATIVE MG/DL
KETONES, URINE: ABNORMAL
LEUKOCYTE ESTERASE, URINE: ABNORMAL
LIPASE: 58.7 U/L (ref 5.6–51.3)
MAGNESIUM: 2 MG/DL (ref 1.6–2.4)
MISCELLANEOUS 2: ABNORMAL
NITRITE, URINE: NEGATIVE
OSMOLALITY CALCULATION: 280.3 MOSMOL/KG (ref 275–300)
PH UA: 6 (ref 5–9)
POTASSIUM SERPL-SCNC: 3.7 MEQ/L (ref 3.5–5.2)
PROTEIN UA: ABNORMAL
RBC URINE: ABNORMAL /HPF
RENAL EPITHELIAL, UA: ABNORMAL
SALICYLATE, SERUM: < 0.3 MG/DL (ref 2–10)
SARS-COV-2, NAAT: NOT  DETECTED
SODIUM BLD-SCNC: 141 MEQ/L (ref 135–145)
SPECIFIC GRAVITY, URINE: 1.03 (ref 1–1.03)
TOTAL PROTEIN: 8 G/DL (ref 6.1–8)
TSH SERPL DL<=0.05 MIU/L-ACNC: 1.69 UIU/ML (ref 0.4–4.2)
UROBILINOGEN, URINE: 1 EU/DL (ref 0–1)
WBC UA: ABNORMAL /HPF
YEAST: ABNORMAL

## 2022-01-04 PROCEDURE — 90792 PSYCH DIAG EVAL W/MED SRVCS: CPT | Performed by: PSYCHIATRY & NEUROLOGY

## 2022-01-04 PROCEDURE — 87635 SARS-COV-2 COVID-19 AMP PRB: CPT

## 2022-01-04 PROCEDURE — 1240000000 HC EMOTIONAL WELLNESS R&B

## 2022-01-04 PROCEDURE — 6370000000 HC RX 637 (ALT 250 FOR IP): Performed by: PSYCHIATRY & NEUROLOGY

## 2022-01-04 PROCEDURE — 6370000000 HC RX 637 (ALT 250 FOR IP): Performed by: PHYSICIAN ASSISTANT

## 2022-01-04 PROCEDURE — APPSS30 APP SPLIT SHARED TIME 16-30 MINUTES: Performed by: PHYSICIAN ASSISTANT

## 2022-01-04 RX ORDER — QUETIAPINE FUMARATE 25 MG/1
50 TABLET, FILM COATED ORAL NIGHTLY
Status: DISCONTINUED | OUTPATIENT
Start: 2022-01-04 | End: 2022-01-05

## 2022-01-04 RX ORDER — MAGNESIUM HYDROXIDE/ALUMINUM HYDROXICE/SIMETHICONE 120; 1200; 1200 MG/30ML; MG/30ML; MG/30ML
30 SUSPENSION ORAL EVERY 6 HOURS PRN
Status: DISCONTINUED | OUTPATIENT
Start: 2022-01-04 | End: 2022-01-07 | Stop reason: HOSPADM

## 2022-01-04 RX ORDER — IBUPROFEN 200 MG
400 TABLET ORAL EVERY 6 HOURS PRN
Status: DISCONTINUED | OUTPATIENT
Start: 2022-01-04 | End: 2022-01-07 | Stop reason: HOSPADM

## 2022-01-04 RX ORDER — HYDROXYZINE HYDROCHLORIDE 25 MG/1
50 TABLET, FILM COATED ORAL 3 TIMES DAILY PRN
Status: DISCONTINUED | OUTPATIENT
Start: 2022-01-04 | End: 2022-01-07 | Stop reason: HOSPADM

## 2022-01-04 RX ORDER — NICOTINE 21 MG/24HR
1 PATCH, TRANSDERMAL 24 HOURS TRANSDERMAL DAILY
Status: DISCONTINUED | OUTPATIENT
Start: 2022-01-04 | End: 2022-01-07 | Stop reason: HOSPADM

## 2022-01-04 RX ORDER — ACETAMINOPHEN 325 MG/1
650 TABLET ORAL EVERY 4 HOURS PRN
Status: DISCONTINUED | OUTPATIENT
Start: 2022-01-04 | End: 2022-01-07 | Stop reason: HOSPADM

## 2022-01-04 RX ORDER — TRAZODONE HYDROCHLORIDE 50 MG/1
50 TABLET ORAL NIGHTLY PRN
Status: DISCONTINUED | OUTPATIENT
Start: 2022-01-04 | End: 2022-01-07 | Stop reason: HOSPADM

## 2022-01-04 RX ADMIN — HYDROXYZINE HYDROCHLORIDE 50 MG: 25 TABLET, FILM COATED ORAL at 15:09

## 2022-01-04 RX ADMIN — MAGNESIUM HYDROXIDE 30 ML: 400 SUSPENSION ORAL at 15:09

## 2022-01-04 ASSESSMENT — PAIN SCALES - GENERAL
PAINLEVEL_OUTOF10: 0

## 2022-01-04 ASSESSMENT — SLEEP AND FATIGUE QUESTIONNAIRES
DO YOU USE A SLEEP AID: NO
DO YOU HAVE DIFFICULTY SLEEPING: NO

## 2022-01-04 ASSESSMENT — PATIENT HEALTH QUESTIONNAIRE - PHQ9: SUM OF ALL RESPONSES TO PHQ QUESTIONS 1-9: 13

## 2022-01-04 ASSESSMENT — LIFESTYLE VARIABLES: HISTORY_ALCOHOL_USE: YES

## 2022-01-04 NOTE — GROUP NOTE
Group Therapy Note    Date: 1/4/2022    Group Start Time: 1400  Group End Time: 1430  Group Topic: Healthy Living/Wellness    [facility] 4E    aJy Rich LPN        Group Therapy Note    Attendees: 6        Notes:  Did not attend    Discipline Responsible: Licensed Practical Nurse      Signature:  Jay Rich LPN

## 2022-01-04 NOTE — BH NOTE
INPATIENT RECREATIONAL THERAPY  ADULT BEHAVIORAL SERVICES  EVALUATION    REFERRING PHYSICIAN:   Dr. Candice Jimenez  DIAGNOSIS:   Major Depressive Disorder, Recurrent  PRECAUTIONS:  Standard precautions    HISTORY OF PRESENT ILLNESS/INJURY:   Patient was admitted to the unit due to suicidal ideation and depression. Patient reported command auditory hallucinations telling him to kill himself prior to admission. Patient reported that he is unemployed and has financial stress. Patient has been staying at the [facility]. Patient was recently released from penitent[facility] and has been abusing methamphetamines ever since. Patient was cooperative and pleasant at time of evaluation. PMH:  Please see medical chart for prior medical history, allergies, and medication    HISTORY OF PSYCHIATRIC TREATMENT:  OP:  GELACIO/Luke    DATE OF BIRTH:  [birth year]  GENDER:   male  MARITAL STATUS:   single  EMPLOYMENT STATUS:   unemployed    LIVING SITUATION/SUPPORT:   Lives at the [facility]. EDUCATIONAL LEVEL:    graduate    MEDICATION/DRUG USE:  Methamphetamine abuse. Alcohol use. Heroin abuse. LEISURE INTERESTS:   Watching TV and movies  ACTIVITY PREFERENCE:  Individual - does not care for groups. ACTIVITY TYPES:    Passive. Indoor. COGNITION:   A&Ox4    COPING:   poor  ATTENTION:  fair  RELAXATION:   Patient reported anxiety. SELF-ESTEEM:  poor  MOTIVATION:   Poor - no insight    SOCIAL SKILLS:   Fair - isolates in room  FRUSTRATION TOLERANCE:   Fair to poor - history of cutting. ATTENTION SEEKING:   Isolates in room - history of cutting. COOPERATION:   Cooperative and pleasant  AFFECT:   blunt  APPEARANCE:   appropriate    HEARING:   No problems noted  VISION:   No problems noted   VERBAL COMMUNICATION:   No problems  WRITTEN COMMUNICATION:   No problems    COORDINATION:  No problems  MOBILITY:  Ambulates independently   GOALS:  Identify 2 new positive coping skills by time of discharge.

## 2022-01-04 NOTE — ED PROVIDER NOTES
[facility]  eMERGENCY dEPARTMENT eNCOUnter          CHIEF COMPLAINT       Chief Complaint   Patient presents with    Suicidal       Nurses Notes reviewed and I agree except as noted in the HPI. HISTORY OF PRESENT ILLNESS    Rosette Harvey is a 36 y.o. male who presents with suicidal ideation. Apparently he wants to kill himself. Patient just is released from residential. Is at longterm house. He was in senior living for possession of controlled substances. Patient states today that he felt like he wanted to die. He has no real plan. Patient denies any drugs or alcohol. Denies any physical complaints. Patient is resting comfortably on the cot no apparent distress no other physical complaints at this time. REVIEW OF SYSTEMS     Review of Systems   Constitutional: Negative for activity change, appetite change, diaphoresis, fatigue and unexpected weight change. HENT: Negative for congestion, ear discharge, ear pain, hearing loss, rhinorrhea, sinus pressure, sore throat, trouble swallowing and voice change. Eyes: Negative for photophobia, pain, discharge, redness and itching. Respiratory: Negative for cough, choking, chest tightness, shortness of breath and wheezing. Cardiovascular: Negative for chest pain, palpitations and leg swelling. Gastrointestinal: Negative for abdominal distention, abdominal pain, blood in stool, constipation, diarrhea, nausea and vomiting. Endocrine: Negative for polydipsia, polyphagia and polyuria. Genitourinary: Negative for decreased urine volume, difficulty urinating, dysuria, enuresis, frequency, hematuria and urgency. Musculoskeletal: Negative for arthralgias, back pain, gait problem, myalgias, neck pain and neck stiffness. Skin: Negative for pallor and rash. Allergic/Immunologic: Negative for immunocompromised state.    Neurological: Negative for dizziness, tremors, seizures, syncope, facial asymmetry, weakness, light-headedness, numbness and headaches. Hematological: Negative for adenopathy. Does not bruise/bleed easily. Psychiatric/Behavioral: Positive for suicidal ideas. Negative for agitation and hallucinations. The patient is not nervous/anxious. PAST MEDICAL HISTORY    has no past medical history on file. SURGICAL HISTORY      has a past surgical history that includes hernia repair. CURRENT MEDICATIONS       Previous Medications    OMEPRAZOLE (PRILOSEC) 20 MG DELAYED RELEASE CAPSULE    Take 1 capsule by mouth every morning (before breakfast)       ALLERGIES     has No Known Allergies. FAMILY HISTORY     has no family status information on file. family history is not on file. SOCIAL HISTORY      reports that he has been smoking cigarettes. He has been smoking about 0.50 packs per day. He has never used smokeless tobacco. He reports previous alcohol use. He reports current drug use. Drug: Methamphetamines (Crystal Meth). PHYSICAL EXAM     INITIAL VITALS:  weight is 200 lb (90.7 kg). His oral temperature is 98.1 °F (36.7 °C). His blood pressure is 133/84 and his pulse is 103. His respiration is 18 and oxygen saturation is 98%. Physical Exam  Vitals and nursing note reviewed. Constitutional:       General: He is not in acute distress. Appearance: He is well-developed. He is not diaphoretic. HENT:      Head: Normocephalic and atraumatic. Right Ear: External ear normal.      Left Ear: External ear normal.      Nose: Nose normal.   Eyes:      General: Lids are normal. No scleral icterus. Right eye: No discharge. Left eye: No discharge. Conjunctiva/sclera: Conjunctivae normal.      Right eye: No exudate. Left eye: No exudate. Pupils: Pupils are equal, round, and reactive to light. Neck:      Thyroid: No thyromegaly. Vascular: No JVD. Trachea: No tracheal deviation. Cardiovascular:      Rate and Rhythm: Normal rate and regular rhythm. Pulses: Normal pulses. Heart sounds: Normal heart sounds, S1 normal and S2 normal. No murmur heard. No friction rub. No gallop. Pulmonary:      Effort: Pulmonary effort is normal. No respiratory distress. Breath sounds: Normal breath sounds. No stridor. No wheezing or rales. Chest:      Chest wall: No tenderness. Abdominal:      General: Bowel sounds are normal. There is no distension. Palpations: Abdomen is soft. There is no mass. Tenderness: There is no abdominal tenderness. There is no guarding or rebound. Musculoskeletal:         General: No tenderness. Normal range of motion. Cervical back: Normal range of motion and neck supple. Normal range of motion. Lymphadenopathy:      Cervical: No cervical adenopathy. Skin:     General: Skin is warm and dry. Findings: No bruising, ecchymosis, lesion or rash. Neurological:      Mental Status: He is alert and oriented to person, place, and time. Cranial Nerves: No cranial nerve deficit. Sensory: No sensory deficit. Coordination: Coordination normal.      Deep Tendon Reflexes: Reflexes are normal and symmetric. Psychiatric:         Attention and Perception: Attention normal.         Mood and Affect: Mood normal.         Speech: Speech normal.         Behavior: Behavior normal.         Thought Content: Thought content is not paranoid or delusional. Thought content includes suicidal ideation. Thought content does not include homicidal ideation. Thought content does not include homicidal or suicidal plan.          Cognition and Memory: Cognition normal.         Judgment: Judgment normal.           DIFFERENTIAL DIAGNOSIS:   Mood disorder, substance-induced mood disorder, depression, suicidal ideation    DIAGNOSTIC RESULTS     EKG: All EKG's are interpreted by the Emergency Department Physician who either signs or Co-signs this chart in the absence of a cardiologist.  None    RADIOLOGY: non-plain film images(s) such as CT, Ultrasound and MRI are read by the radiologist.  No orders to display         LABS:   Labs Reviewed   LIPASE - Abnormal; Notable for the following components:       Result Value    Lipase 58.7 (*)     All other components within normal limits   SALICYLATE LEVEL - Abnormal; Notable for the following components:    Salicylate, Serum < 0.3 (*)     All other components within normal limits   URINE WITH REFLEXED MICRO - Abnormal; Notable for the following components:    Ketones, Urine TRACE (*)     Protein, UA TRACE (*)     Leukocyte Esterase, Urine TRACE (*)     Color, UA DK YELLOW (*)     All other components within normal limits   COVID-19, RAPID   CBC WITH AUTO DIFFERENTIAL   BASIC METABOLIC PANEL   HEPATIC FUNCTION PANEL   MAGNESIUM   TSH WITHOUT REFLEX   ETHANOL   ACETAMINOPHEN LEVEL   URINE DRUG SCREEN   ANION GAP   GLOMERULAR FILTRATION RATE, ESTIMATED   OSMOLALITY       EMERGENCY DEPARTMENT COURSE:   Vitals:    Vitals:    01/03/22 2303 01/03/22 2304   BP: 133/84    Pulse: 103    Resp: 18    Temp: 98.1 °F (36.7 °C)    TempSrc: Oral    SpO2: 98%    Weight:  200 lb (90.7 kg)     Patient was assessed at bedside appropriate labs were ordered. [facility] has been consulted. Here today I reviewed all labs. Patient is cleared medically from current psychiatric complaint. WILTON consulted with psychiatry. Psychiatry felt patient would benefit from admission and treatment. Requested to place the patient under an [facility]. Patient is admitted to psychiatry in stable condition. CRITICAL CARE:   None    CONSULTS:  WILTON    PROCEDURES:  None    FINAL IMPRESSION      1. Suicidal thoughts    2. Methamphetamine abuse ([facility] [address]          DISPOSITION/PLAN   Admission    PATIENT REFERRED TO:  No follow-up provider specified.     DISCHARGE MEDICATIONS:  New Prescriptions    No medications on file       (Please note that portions of this note were completed with a voice recognition program.  Efforts were made to edit the dictations but occasionally words are mis-transcribed.)    Denver Carrilloelor, DO Yina Gonzalez DO  01/04/22 9784

## 2022-01-04 NOTE — ED TRIAGE NOTES
Pt comes to ED with [facility] voluntarily with c/o suicidal ideation. Pt states he is using meth currently and not sleeping. Pt states that his last meth use was yesterday. Pt states he has had depression for a while. Pt states he has tried cutting himself in the past and that would be his plan to kill himself this time. Pt states he was here Monday and was released. Pt is calm and cooperative at this time.

## 2022-01-04 NOTE — BH NOTE
Behavioral Health   Admission Note     Admission Type:   Admission Type: Involuntary    Reason for admission:  Reason for Admission: suicidal thoughts    PATIENT STRENGTHS:  Strengths: No significant Physical Illness,Communication    Patient Strengths and Limitations:  Limitations: Difficulty problem solving/relies on others to help solve problems,Inappropriate/potentially harmful leisure interests    Addictive Behavior:   Addictive Behavior  In the past 3 months, have you felt or has someone told you that you have a problem with:  : None  Do you have a history of Chemical Use?: No  Do you have a history of Alcohol Use?: Yes  Do you have a history of Street Drug Abuse?: Yes  Histroy of Prescripton Drug Abuse?: No    Medical Problems:   Past Medical History:   Diagnosis Date    Psychiatric problem        Status EXAM:  Status and Exam  Normal: No  Facial Expression: Avoids Gaze,Worried,Sad  Affect: Blunt  Level of Consciousness: Alert  Mood:Normal: No  Mood: Depressed,Anxious  Motor Activity:Normal: Yes  Interview Behavior: Cooperative  Preception: [place] to Person,[place] to Time,[place] to Place,[place] to Situation  Attention:Normal: Yes  Thought Processes: Circumstantial  Thought Content:Normal: Yes  Hallucinations: None  Delusions: No  Memory:Normal: Yes  Insight and Judgment: No  Insight and Judgment: Poor Judgment,Poor Insight  Present Suicidal Ideation: No  Present Homicidal Ideation: No    Pt admitted with followings belongings:  Dental Appliances: None  Vision - Corrective Lenses: None  Hearing Aid: None  Jewelry: None  Body Piercings Removed: N/A  Clothing: Footwear,Pants,Shirt,Sweater,Socks,Undergarments (Comment),Other (Comment) (toiletries, cigs, lighter)  Were All Patient Medications Collected?: Not Applicable  Other Valuables: None     Admission order obtained yes. Pt's clothes and belongings placed in lock up. Patient oriented to surroundings and program expectations and copy of patient rights given. Received admission packet:  yes. Consents reviewed, signed yes. Patient verbalize understanding:  yes. Patient education on precautions: yes           Patient screened positive for suicide risk on CSSR-S (\"yes\" to question #4, 5, OR 6)  Dr GRANT notified of risk score. Orders received for close observation . 2 person skin assessment refused by pt, pt denies any skin issues. Explained patients right to have family, representative or physician notified of their admission. Patient has Declined for physician to be notified. Patient has Declined for family/representative to be notified. Provided pt with [employer] handout entitled \"Quitting Smoking. \"  Reviewed handout with pt addressing dangers of smoking, developing coping skills, and providing basic information about quitting. Pt response to counseling:  Pt does not want to stop smoking at present. 37 yo male pt admiitted from ED under an [address]. Pt reports he was released from custodial 1 week ago after serving 14 months, pt is living at a half-way [facility] here in [facility]. Pt reports he has been using crystal meth everyday for the past week and has used heroin a couple times. Pt reports having depression and anxiety, was having suicidal thoughts, denies having a plan. Pt denies suicidal thoughts at present, denies homicidal thoughts or hallucinations. Pt oriented to unit and his room. Pt taking a shower at present.            Kan Ireland RN

## 2022-01-04 NOTE — PLAN OF CARE
Patient has not attended any groups and has not been out of his room to socialize with others this shift so he has not met his socialization goal for today.

## 2022-01-04 NOTE — PLAN OF CARE
Problem: Depressive Behavior With or Without Suicide Precautions:  Goal: Able to verbalize and/or display a decrease in depressive symptoms  Description: Able to verbalize and/or display a decrease in depressive symptoms  Outcome: Ongoing  Note: Rates his mood 5 out of 10 with 10 being the best mood. Reports anxiety and depression. Problem: Depressive Behavior With or Without Suicide Precautions:  Goal: Able to verbalize support systems  Description: Able to verbalize support systems  Outcome: Ongoing  Note: Does not verbalize his support system. Problem: Depressive Behavior With or Without Suicide Precautions:  Goal: Participates in care planning  Description: Participates in care planning  Outcome: Ongoing  Note: Care plan reviewed with patient. Patient does verbalize understanding of the plan of care and does contribute to goal setting      Problem: Substance Abuse:  Goal: Absence of drug withdrawal signs and symptoms  Description: Absence of drug withdrawal signs and symptoms  Outcome: Ongoing  Note: Patient is anxious, unable to sit still. Pacing halls. Took 2 showers today. Problem: Discharge Planning:  Goal: Discharged to appropriate level of care  Description: Discharged to appropriate level of care  Outcome: Ongoing  Note: Discharge planning in process. Problem: Suicide risk  Goal: Provide patient with safe environment  Description: Provide patient with safe environment  Outcome: Ongoing  Note: Patient provided with a safe environment.

## 2022-01-04 NOTE — PROGRESS NOTES
Behavioral Services  Medicare Certification Upon Admission    I certify that this patient's inpatient psychiatric hospital admission is medically necessary for:    [x] (1) Treatment which could reasonably be expected to improve this patient's condition,       [x] (2) Or for diagnostic study;     AND     [x](2) The inpatient psychiatric services are provided while the individual is under the care of a physician and are included in the individualized plan of care.     Estimated length of stay/service 3-5 days    Plan for post-hospital care hc    Electronically signed by Mason Izquierdo MD on 1/4/2022 at 11:04 AM

## 2022-01-04 NOTE — PROGRESS NOTES
[facility]  Initial Interdisciplinary Treatment Plan NOTE    Review Date & Time: 01/04/22 1505    Patient was in treatment team.  See Multidisciplinary Treatment Team sheet for participants. Admission Type:   Admission Type: Involuntary Pt signed in Voluntarily today    Reason for admission:  Reason for Admission: suicidal thoughts      Estimated Length of Stay Update:  01/06/22  Estimated Discharge Date Update: 3-5 days    PATIENT STRENGTHS:  Patient Strengths Strengths: No significant Physical Illness,Communication  Patient Strengths and Limitations:Limitations: Difficulty problem solving/relies on others to help solve problems,Inappropriate/potentially harmful leisure interests  Addictive Behavior:Addictive Behavior  In the past 3 months, have you felt or has someone told you that you have a problem with:  : None  Do you have a history of Chemical Use?: No  Do you have a history of Alcohol Use?: Yes  Do you have a history of Street Drug Abuse?: Yes  Histroy of Prescripton Drug Abuse?: No  Medical Problems:  Past Medical History:   Diagnosis Date    Psychiatric problem        EDUCATION:   Learner Progress Toward Treatment Goals: Reviewed results and recommendations of this team, Reviewed group plan and strategies, Reviewed signs, symptoms and risk of self harm and violent behavior and Reviewed goals and plan of care    Method: Individual    Outcome: Verbalized understanding, Demonstrated Understanding and Needs reinforcement    PATIENT GOALS: See below    PLAN/TREATMENT RECOMMENDATIONS UPDATE:   1. What is the most important thing we can help you with while you are here? Help for my mental Health  2. Who is your support system? Support available,  with the Department of Corrections   3. Do you have follow-up providers? None  4. Do you have the ability to pay for your medications? Traditional [place] medicaid  5.  Where will you be residing when you leave the hospital? [facility])  6. Will need a return to work slip or FMLA paper completion?  No. Unemployed      GOALS UPDATE:   Time frame for Short-Term Goals:Daily    ARA Rosenberg

## 2022-01-04 NOTE — PROGRESS NOTES
24 Hour Crisis Re-Assess:     C-SSRS Completed:     Current Suicide Risk: Low, Moderate or High: High      Present Homicidal Behavior: (specify)    Verbal (specify) Denies    Plan (specify) Denies    Current Physical Aggression (specify) Denies    Psychosis:    Hallucinations: (specify) Denies    Delusions: (specify) Denies    Safety Plan (completed or reviewed) Response of Patient-Specify      Clinical Re-Assessment Summary   Patient presents with suicidal thoughts. Patient reports thoughts come and go. Patient has monotone voice and flat affect. Patient reports history of suicide attempts. Patient denies delusions/hallucinations. Patient denies any thought/plan or intent to harm others. Patient is cooperative with the interview. Updated Level of Care Disposition:    Consulted with Dr. Diane Gonzalez concerning the mental status of patient. Consulted with Dr. Cuate Bolaños concerning the mental status of patient Patient is accepted to the care of Dr. Cuate Bolaños under [facility] status . ER updated on plan of care. Report given to RN Larena Phalen on 4E.

## 2022-01-04 NOTE — PROGRESS NOTES
Psychosocial Assessment    Current Level of Psychosocial Functioning     Independent      XXX  Dependent    Minimal Assist     Comments:      Psychosocial High Risk Factors (check all that apply)    Unable to obtain meds   Chronic illness/pain    Substance abuse      XXX  Lack of Family Support   Financial stress      XXX  Isolation   Inadequate Community Resources  Suicide attempt(s)  Not taking medications   Victim of crime   Developmental Delay  Unable to manage personal needs    Age 72 or older   Homeless  No transportation   Readmission within 30 days  Unemployment     XXX  Traumatic Event  Legal       XXX    Family/Supports identified:     Sexual Orientation:      Heterosexual    Patient Strengths:     Housing, Case management with the [facility]    Patient Barriers: Active drug addiction    Safety plan:      Contracts for safety    CMHC/ history:     AOD treatment in [facility], past suboxone through [employer] of Care:  medication management, group/individual therapies, family meetings, psycho -education, treatment team meetings to assist with stabilization    Initial Discharge Plan: Return to the [facility]. Conect with [facility]     Clinical Summary:  Jennifer Benjamin is a 36year old male with a history of substance abuse and depression. He presented to the ED with suicidal ideation, secondary to his substance abuse. Pt was released from [facility] 1 week ago and is living at the [facility]. He has been using methamphetamine daily since his release from [facility]. Pt reports snorting a half gram of methamphetamine, daily, for 1 week. He has also snorted fentynal 3 times. Pt is smoking marijuana as well. Onset of marijuana was at age 17. Methamphetamine in 2013 and fentanyl 2018. In 2020, pt was given narcan following an overdose on fentanyl and methamphetamine. The only clean time pt reports having, was for 1 year while in [facility].  He was involved in a drug program at that time. Pt was raised in [place]. He was convicted for robbery as a juvenile and went to [facility]. He is presently involved in the [facility] re-entry program and has a . Family history is positive for alcoholism in his Father. Denies mental health family history.

## 2022-01-05 PROCEDURE — 6370000000 HC RX 637 (ALT 250 FOR IP): Performed by: PSYCHIATRY & NEUROLOGY

## 2022-01-05 PROCEDURE — APPSS30 APP SPLIT SHARED TIME 16-30 MINUTES: Performed by: PHYSICIAN ASSISTANT

## 2022-01-05 PROCEDURE — 99232 SBSQ HOSP IP/OBS MODERATE 35: CPT | Performed by: PSYCHIATRY & NEUROLOGY

## 2022-01-05 PROCEDURE — 90833 PSYTX W PT W E/M 30 MIN: CPT | Performed by: PSYCHIATRY & NEUROLOGY

## 2022-01-05 PROCEDURE — 1240000000 HC EMOTIONAL WELLNESS R&B

## 2022-01-05 RX ORDER — QUETIAPINE FUMARATE 25 MG/1
75 TABLET, FILM COATED ORAL NIGHTLY
Status: DISCONTINUED | OUTPATIENT
Start: 2022-01-05 | End: 2022-01-06

## 2022-01-05 RX ADMIN — MAGNESIUM HYDROXIDE 30 ML: 400 SUSPENSION ORAL at 16:09

## 2022-01-05 RX ADMIN — HYDROXYZINE HYDROCHLORIDE 50 MG: 25 TABLET, FILM COATED ORAL at 09:54

## 2022-01-05 RX ADMIN — TRAZODONE HYDROCHLORIDE 50 MG: 50 TABLET ORAL at 21:00

## 2022-01-05 ASSESSMENT — PAIN SCALES - GENERAL
PAINLEVEL_OUTOF10: 0
PAINLEVEL_OUTOF10: 0

## 2022-01-05 ASSESSMENT — PAIN - FUNCTIONAL ASSESSMENT: PAIN_FUNCTIONAL_ASSESSMENT: ACTIVITIES ARE NOT PREVENTED

## 2022-01-05 NOTE — PLAN OF CARE
Patient has not attended any groups so far today and has not been out of his room to socialize with others this shift so he has not met his socialization goal. Patient will be encouraged to attend all groups on the unit daily during the rest of his hospital stay.

## 2022-01-05 NOTE — PROGRESS NOTES
Department of Psychiatry  Progress Note     Chief Complaint:  Psychosis, suicidal ideation     SUBJECTIVE:    PROGRESS:  Vic Christensen is seen laying in bed. He reports he is doing okay today. Rates his mood 4 out of 10 with 10 being the best.  He says his depression is high today. He endorses fleeting suicidal thoughts but denies any plan or intent. He is able to contract for safety on the unit. He also reports he has been feeling anxious. When asked what is making him anxious, he says being at the [facility] because there are drugs there. He reports he has been feeling restless at times which may be secondary to methamphetamine withdrawal.  He has been utilizing Atarax as needed for his anxiety. He denies any hallucinations so far today. He reports he is feeling a little paranoid but denies being paranoid about anything specific. He states he slept on and off last night. He did not take trazodone and refused to take his Seroquel last night because it makes him tired. Discussed with patient about adding a different psychiatric medication such as Remeron to help with mood and sleep but he is not interested at this time. Staff reported he slept 8.5 hours continuous last night. He also slept on and off throughout the day yesterday. He has been eating okay and adequately hydrating. He has been isolative to his room and has not been attending groups.     Suicidal ideations: Fleeting without current plan or intent  Compliance with medications: poor-refusing Seroquel  Medication side effects: absent  ROS: Patient has new complaints:  no  Sleep quality: 8.5 hours continuous last night per staff  Attending groups: No      OBJECTIVE      Medications  Current Facility-Administered Medications: acetaminophen (TYLENOL) tablet 650 mg, 650 mg, Oral, Q4H PRN  ibuprofen (ADVIL;MOTRIN) tablet 400 mg, 400 mg, Oral, Q6H PRN  magnesium hydroxide (MILK OF MAGNESIA) 400 MG/5ML suspension 30 mL, 30 mL, Oral, Daily PRN  aluminum & magnesium hydroxide-simethicone (MAALOX) 200-200-20 MG/5ML suspension 30 mL, 30 mL, Oral, Q6H PRN  hydrOXYzine (ATARAX) tablet 50 mg, 50 mg, Oral, TID PRN  traZODone (DESYREL) tablet 50 mg, 50 mg, Oral, Nightly PRN  nicotine (NICODERM CQ) 21 MG/24HR 1 patch, 1 patch, TransDERmal, Daily  QUEtiapine (SEROQUEL) tablet 50 mg, 50 mg, Oral, Nightly     Physical     height is 6' (1.829 m) and weight is 200 lb (90.7 kg). His tympanic temperature is 97.7 °F (36.5 °C). His blood pressure is 124/72 and his pulse is 82. His respiration is 16 and oxygen saturation is 96%.    Lab Results   Component Value Date    WBC 5.8 01/03/2022    HGB 16.1 01/03/2022    HCT 47.0 01/03/2022     01/03/2022    ALT 19 01/03/2022    AST 28 01/03/2022     01/03/2022    K 3.7 01/03/2022     01/03/2022    CREATININE 0.9 01/03/2022    BUN 9 01/03/2022    CO2 26 01/03/2022    TSH 1.690 01/03/2022          Mental Status Exam:   Level of consciousness:   Awake   Appearance:  well-appearing, hospital attire, lying in bed, fair grooming and fair hygiene  Behavior/Motor: Restless at times  Attitude toward examiner:  cooperative, attentive and poor eye contact  Speech:  spontaneous, normal rate and normal volume  Mood:  Depressed, anxious  Affect:  mood congruent  Thought processes:  linear and goal directed  Thought content:  Denies homicidal ideation  Suicidal Ideation:   Fleeting, without current plan and intent  Delusions:  Vague paranoia  Perceptual Disturbance: denies any current perceptual disturbance  Cognition: Patient is oriented to person, place, time and situation  Concentration: clinically adequate  Memory: intact  Insight & Judgement: Poor    ASSESSMENT     Acute psychosis ([facility] [place] [address]   Rule out substance induced mood disorder/psychosis, MDD with psychosis   Methamphetamine use disorder, severe, dependence  Cannabis use disorder, severe    PLAN    Patient's symptoms show minimal improvement today  Encouraged medication compliance  Continue current medication regimen and observe  Attempt to develop insight, psycho-education and supportive therapy conducted. Probable discharge: To be determined  Follow-up: [facility] PSYCHIATRIC outpatient, daily while on inpatient unit    Electronically signed by Mayte Mart PA-C on 1/5/2022 at 2:37 PM Reviewed patient's current plan of care and vital signs with nursing staff. Psychiatry Attending Attestation     I assessed this patient and reviewed the case and plan of care with Mayte Mart PA-C. I have reviewed the above documentation and I agree with the findings and treatment plan with the following updates. Patient laying in bed, withdrawn. Reports his mood is up and down. Patient is irritable off and on. Reports that he does not want to go back to the [facility] as patients are abusing methamphetamine over there. Mention to him that he has to discuss this with court as he has a court order to go there. Reports suicidal thoughts still come and go. Mentions that he has been having some commanding auditory hallucinations. Discussed with him about continue to optimize his Seroquel. He is agreeable to the plan. Reports concentration is poor. Patient feels helpless ,hopless and worthless. Patient is oriented to time place and person. Denies any hallucinations or delusions. Sleep is fluctuating, appetite is poor. Patient feels down depressed nervous and anxious. Patient has marked anticipatory anxiety that was explored during the session, support was given and clarification done. No side effects from medication reported. Side-effect of medication were discussed with the patient. Case was discussed with the  and with the staff. Session was supportive. Than 16 minutes of the session was spent doing supportive psychotherapy. Session started around 10:30 AM today ended at around 11 AM today.       More than 16 mins of the session was spent doing Supportive psychotherapy and coordinating patient's care. Session started at 10am and ended at 10:30am.     Electronically signed by Adele De La Rosa MD on 1/5/22 at 2:39 PM EST    **This report has been created using voice recognition software. It may contain minor errors which are inherent in voice recognition technology. **

## 2022-01-05 NOTE — PLAN OF CARE
Problem: Depressive Behavior With or Without Suicide Precautions:  Goal: Able to verbalize support systems  Description: Able to verbalize support systems  1/4/2022 2350 by Verner Howell, RN  Outcome: Ongoing  Note: States he does have a positive support system  1/4/2022 1530 by Susu Berumen RN  Outcome: Ongoing  Note: Does not verbalize his support system. Problem: Substance Abuse:  Goal: Absence of drug withdrawal signs and symptoms  Description: Absence of drug withdrawal signs and symptoms  1/4/2022 2350 by Verner Howell, RN  Outcome: Ongoing  Note: Patient denies withdrawal signs and symptoms  1/4/2022 1530 by Susu Berumen RN  Outcome: Ongoing  Note: Patient is anxious, unable to sit still. Pacing halls. Took 2 showers today. Problem: Discharge Planning:  Goal: Discharged to appropriate level of care  Description: Discharged to appropriate level of care  1/4/2022 2350 by Verner Howell, RN  Outcome: Ongoing  Note: Plans to be discharged to [facility] and follow up is unknown  1/4/2022 1530 by Susu Berumen RN  Outcome: Ongoing  Note: Discharge planning in process. Problem: Suicide risk  Goal: Provide patient with safe environment  Description: Provide patient with safe environment  1/4/2022 2350 by Verner Howell, RN  Outcome: Ongoing  Note: Patient remained safe and free of harm  1/4/2022 1530 by Susu Berumen RN  Outcome: Ongoing  Note: Patient provided with a safe environment.       Problem: Depressive Behavior With or Without Suicide Precautions:  Goal: Able to verbalize and/or display a decrease in depressive symptoms  Description: Able to verbalize and/or display a decrease in depressive symptoms  1/4/2022 2350 by Verner Howell, RN  Outcome: Not Met This Shift  Note: Reports continued high depression and anxiety and mood of 7/10, isolates to room  1/4/2022 2349 by Verner Howell, RN  Outcome: Not Met This Shift  Note: Reports continued high depression and anxiety and mood of 7/10, isolates to room  1/4/2022 1530 by Yohana Gaytan RN  Outcome: Ongoing  Note: Rates his mood 5 out of 10 with 10 being the best mood. Reports anxiety and depression. Goal: Participates in care planning  Description: Participates in care planning  1/4/2022 2350 by Curtis Webb RN  Outcome: Not Met This Shift  Note: Patient is taking his medication, cooperative with assessment, but is isolative to room and not attending groups  1/4/2022 1530 by Yohana Gaytan RN  Outcome: Ongoing  Note: Care plan reviewed with patient. Patient does verbalize understanding of the plan of care and does contribute to goal setting      Problem: Coping:  Goal: Ability to identify problematic behaviors that deter socialization will improve  Description: Ability to identify problematic behaviors that deter socialization will improve  1/4/2022 2350 by Curtis Webb RN  Outcome: Not Met This Shift  Note: Cooperative with staff during assessment, but isolative to room with no peers interaction and did not attend groups  1/4/2022 1555 by Kalli Jamil  Outcome: Not Met This Shift     Problem: Anxiety:  Goal: Level of anxiety will decrease  Description: Level of anxiety will decrease  Outcome: Not Met This Shift  Note: Reports continued high depression and anxiety and mood of 7/10, isolates to room   Care plan reviewed with patient and  verbalize understanding of the plan of care and contribute to goal setting.

## 2022-01-05 NOTE — BH NOTE
Patient refused HS seroquel stating it makes him sleepy.  Explained that it was to help with his sleep but he continued to refuse to take

## 2022-01-05 NOTE — PLAN OF CARE
Problem: Depressive Behavior With or Without Suicide Precautions:  Goal: Able to verbalize and/or display a decrease in depressive symptoms  Description: Able to verbalize and/or display a decrease in depressive symptoms  1/5/2022 1102 by Jennifer Tristan RN  Outcome: Ongoing  Note: Pt reports his mood to be a 4 out of a scale of 1 to 10 with 10 being normal. Pt shared he has been struggling with a depressed mood prior to admission feeling hopeless related to his unhealthy environment  1/4/2022 2350 by Sarah Hazel RN  Outcome: Not Met This Shift  Note: Reports continued high depression and anxiety and mood of 7/10, isolates to room  1/4/2022 2349 by Sarah Hazel RN  Outcome: Not Met This Shift  Note: Reports continued high depression and anxiety and mood of 7/10, isolates to room  Goal: Able to verbalize support systems  Description: Able to verbalize support systems  1/5/2022 1102 by Jennifer Tristan RN  Outcome: Ongoing  Note: Pt reports having minimal support and his family is from [facility]  1/4/2022 2350 by Sarah Hazel RN  Outcome: Ongoing  Note: States he does have a positive support system  Goal: Participates in care planning  Description: Participates in care planning  1/5/2022 1102 by Jennifer Tristan RN  Outcome: Ongoing  1/4/2022 2350 by Sarah Hazel RN  Outcome: Not Met This Shift  Note: Patient is taking his medication, cooperative with assessment, but is isolative to room and not attending groups     Problem: Substance Abuse:  Goal: Absence of drug withdrawal signs and symptoms  Description: Absence of drug withdrawal signs and symptoms  1/5/2022 1102 by Jennifer Tristan RN  Outcome: Ongoing  Note: Pt observed to be restless, feet moving from side to side, pt shifting his position in his bed while laying down.  Medicated with prn Atarax see MAR  1/4/2022 2350 by Sarah Hazel RN  Outcome: Ongoing  Note: Patient denies withdrawal signs and symptoms Problem: Discharge Planning:  Goal: Discharged to appropriate level of care  Description: Discharged to appropriate level of care  1/5/2022 1102 by Chandan Moralez RN  Outcome: Ongoing  Note: Pt working with treatment team to explore a healthy environment that support recovery and total abstinence from mood altering addictive substances. 1/4/2022 2350 by Nehemias Caldera RN  Outcome: Ongoing  Note: Plans to be discharged to [facility] and follow up is unknown     Problem: Suicide risk  Goal: Provide patient with safe environment  Description: Provide patient with safe environment  1/5/2022 1102 by Chandan Moralez RN  Outcome: Ongoing  Note: Safe environment maintained pt continues with visual 15 min checks as ordered for pt's safety  1/4/2022 2350 by Nehemias Caldera RN  Outcome: Ongoing  Note: Patient remained safe and free of harm     Problem: Coping:  Goal: Ability to identify problematic behaviors that deter socialization will improve  Description: Ability to identify problematic behaviors that deter socialization will improve  1/5/2022 1102 by Chandan Moralez RN  Outcome: Ongoing  1/4/2022 2350 by Nehemias Caldera RN  Outcome: Not Met This Shift  Note: Cooperative with staff during assessment, but isolative to room with no peers interaction and did not attend groups     Problem: Anxiety:  Goal: Level of anxiety will decrease  Description: Level of anxiety will decrease  1/5/2022 1102 by Chandan Moralez RN  Outcome: Ongoing  Note: Anxiety continues pt provided with education on benefits and purpose of accepting antianxiety medications. We discussed the importance of letting the mind/body rest in early recovery.  Pt began to tear up discussing the negative environment he resides at and his feeling of hopelessness  1/4/2022 2350 by Nehemias Caldera RN  Outcome: Not Met This Shift  Note: Reports continued high depression and anxiety and mood of 7/10, isolates to room   Care plan reviewed with patient. Patient does verbalize understanding of the plan of care and did  contribute to goal setting.

## 2022-01-06 PROCEDURE — 99232 SBSQ HOSP IP/OBS MODERATE 35: CPT | Performed by: PSYCHIATRY & NEUROLOGY

## 2022-01-06 PROCEDURE — 6370000000 HC RX 637 (ALT 250 FOR IP): Performed by: PSYCHIATRY & NEUROLOGY

## 2022-01-06 PROCEDURE — 1240000000 HC EMOTIONAL WELLNESS R&B

## 2022-01-06 PROCEDURE — APPSS30 APP SPLIT SHARED TIME 16-30 MINUTES: Performed by: PHYSICIAN ASSISTANT

## 2022-01-06 PROCEDURE — 90833 PSYTX W PT W E/M 30 MIN: CPT | Performed by: PSYCHIATRY & NEUROLOGY

## 2022-01-06 RX ORDER — MIRTAZAPINE 7.5 MG/1
7.5 TABLET, FILM COATED ORAL NIGHTLY
Status: DISCONTINUED | OUTPATIENT
Start: 2022-01-06 | End: 2022-01-07 | Stop reason: HOSPADM

## 2022-01-06 RX ADMIN — TRAZODONE HYDROCHLORIDE 50 MG: 50 TABLET ORAL at 20:56

## 2022-01-06 RX ADMIN — HYDROXYZINE HYDROCHLORIDE 50 MG: 25 TABLET, FILM COATED ORAL at 14:54

## 2022-01-06 ASSESSMENT — PAIN SCALES - GENERAL
PAINLEVEL_OUTOF10: 0
PAINLEVEL_OUTOF10: 0

## 2022-01-06 NOTE — PLAN OF CARE
Patient has not attended any of the groups today and has not been out of his room to socialize with others this shift so he has not met his socialization goal. Patient will be encouraged to come out of his room to socialize with others daily and to attend all of the groups offered on the unit during the rest of his hospital stay.

## 2022-01-06 NOTE — DISCHARGE INSTR - DIET

## 2022-01-06 NOTE — PLAN OF CARE
Problem: Depressive Behavior With or Without Suicide Precautions:  Goal: Able to verbalize and/or display a decrease in depressive symptoms  Description: Able to verbalize and/or display a decrease in depressive symptoms  1/6/2022 1036 by Rea Connell RN  Outcome: Ongoing  Note: Patient reports mood 5/10 with 10 being normal. Has flat affect. Speech clear. Fair eye contact. Reports no hope for future and identifies no one as their support system. Problem: Depressive Behavior With or Without Suicide Precautions:  Goal: Able to verbalize support systems  Description: Able to verbalize support systems  1/6/2022 1036 by Rea Connell RN  Outcome: Ongoing  Note: Patient reports no one as their support system. Problem: Depressive Behavior With or Without Suicide Precautions:  Goal: Participates in care planning  Description: Participates in care planning  1/6/2022 1036 by Rea Connell RN  Outcome: Ongoing  Note: Patient discussed treatment plan with physician/medical staff, not attending group, and compliant with medications. Problem: Substance Abuse:  Goal: Absence of drug withdrawal signs and symptoms  Description: Absence of drug withdrawal signs and symptoms  1/6/2022 1036 by Rea Connell RN  Outcome: Ongoing  Note: Patient denies withdrawal symptoms this am.     Problem: Discharge Planning:  Goal: Discharged to appropriate level of care  Description: Discharged to appropriate level of care  1/6/2022 1036 by Rea Connell RN  Outcome: Ongoing  Note: Patient voices he would like to find somewhere else to live, does not want to go back to the [facility]. Discharge planner working with patient to achieve optimal discharge plans, specific to individual needs.        Problem: Suicide risk  Goal: Provide patient with safe environment  Description: Provide patient with safe environment  1/6/2022 1036 by Rea Connell RN  Outcome: Ongoing  Note: Patient remains in safe environment, denies suicidal ideations at present time. Problem: Anxiety:  Goal: Level of anxiety will decrease  Description: Level of anxiety will decrease  1/6/2022 1036 by Lauryn Narayanan RN  Outcome: Ongoing  Note: Patient reports \"little\" anxiety. Patient refused PRN anxiety medications so far this shift. Care plan reviewed with patient. Patient verbalize understanding of the plan of care and contribute to goal setting.

## 2022-01-06 NOTE — PROGRESS NOTES
Department of Psychiatry  Progress Note     Chief Complaint:  Psychosis, suicidal ideation     SUBJECTIVE:    PROGRESS:  Derick Barnhart is seen laying in bed. He is receptive to interaction and cooperative with the interview. He reports he is doing okay today. Rates his mood 5 out of 10 with 10 being the best.  He says his depression is the same today. He endorses fleeting suicidal thoughts but denies any plan or intent. He reports the thoughts are occurring less frequently. He is able to contract for safety on the unit. He continues to feel hopeless and helpless about his current living situation. He does not want to go back to the [facility] because there are drugs there. He reports his 6 month stay at the [facility] is overseen by [facility]. He does not have a  at this time. This has also been making him feel anxious. He reports he has been feeling restless at times which may be secondary to methamphetamine withdrawal or anxiety. He has been utilizing Atarax as needed for his anxiety. He denies any hallucinations so far today. He reports he is feeling a little paranoid but denies being paranoid about anything specific. He states he slept on and off last night. He did take trazodone but refused to take his Seroquel last night because it makes him tired. Staff reported he slept 8 hours broken last night. He also slept on and off throughout the day yesterday. He has been eating alright and adequately hydrating. He has been isolative to his room and has not been attending groups.     Suicidal ideations: Fleeting without current plan or intent  Compliance with medications: poor-refusing Seroquel  Medication side effects: absent  ROS: Patient has new complaints:  no  Sleep quality: 8 hours broken last night per staff  Attending groups: No      OBJECTIVE      Medications  Current Facility-Administered Medications: QUEtiapine (SEROQUEL) tablet 75 mg, 75 mg, Oral, Nightly  acetaminophen (TYLENOL) tablet 650 dependence  Cannabis use disorder, severe    PLAN    Patient's symptoms show no significant improvement today  Encouraged medication compliance as patient has been refusing his Seroquel   Continue current medication regimen and observe. Patient is not interested in taking a different psychiatric medication for mood and sleep at this time. Attempt to develop insight, psycho-education and supportive therapy conducted. Probable discharge: To be determined  Follow-up: [facility] PSYCHIATRIC outpatient, daily while on inpatient unit    Electronically signed by Che Elizabeth PA-C on 1/6/2022 at 1:03 PM Reviewed patient's current plan of care and vital signs with nursing staff. Psychiatry Attending Attestation     I assessed this patient and reviewed the case and plan of care with Che Elizabeth PA-C. I have reviewed the above documentation and I agree with the findings and treatment plan with the following updates. Patient feels better than before. Mood and affect are better. Patient reports fleeting suicidal thoughts with no intent or plan. Patient notes that these thoughts are occurring less frequently. Denies any homicidal thoughts, that was explored with the patient. Oriented to time place and person. Recent and remote memory is intact. Patient feels hopeful. Sleep and appetite is good. He reports that he is on longer wants to take Seroquel and he has been refusing to take it as it has been making him feel groggy. Discussed with him about discontinuing Seroquel and starting Remeron to help with his mood. He is agreeable to the plan. More than 16 mins of the session was spent doing Supportive psychotherapy and coordinating patient's care. Session started at 10am and ended at 10:30am.   Discharge soon, if patient continues to show improvement. Case discussed with the staff.     Electronically signed by Hermelindo Mauricio MD on 1/6/22 at 3:28 PM EST    **This report has been created using voice recognition software. It may contain minor errors which are inherent in voice recognition technology. **

## 2022-01-06 NOTE — PLAN OF CARE
Problem: Depressive Behavior With or Without Suicide Precautions:  Goal: Able to verbalize and/or display a decrease in depressive symptoms  Description: Able to verbalize and/or display a decrease in depressive symptoms  1/5/2022 2124 by Lawyer Susan LPN  Outcome: Ongoing  Note: Pt is unable to verbalize a decrease in depression at this time. 1/5/2022 1102 by Edi Garcia RN  Outcome: Ongoing  Note: Pt reports his mood to be a 4 out of a scale of 1 to 10 with 10 being normal. Pt shared he has been struggling with a depressed mood prior to admission feeling hopeless related to his unhealthy environment  Goal: Able to verbalize support systems  Description: Able to verbalize support systems  1/5/2022 2124 by Lawyer Susan LPN  Outcome: Ongoing  Note:   Pt reports having minimal support and his family is from [facility]  1/5/2022 1102 by Edi Garcia RN  Outcome: Ongoing  Note: Pt reports having minimal support and his family is from [place] [id number] in care planning  Description: Participates in care planning  1/5/2022 2124 by Lawyer Susan LPN  Outcome: Met This Shift  Note: Pt participates in care planning and goal setting   1/5/2022 1102 by Edi Garcia RN  Outcome: Ongoing     Problem: Substance Abuse:  Goal: Absence of drug withdrawal signs and symptoms  Description: Absence of drug withdrawal signs and symptoms  1/5/2022 2124 by Lawyer Susan LPN  Outcome: Ongoing  Note:   Pt observed to be restless, feet moving from side to side. Pt offered Atarax, pt refused at this time because \"it just keeps putting him to sleep\"  1/5/2022 1102 by Edi Garcia RN  Outcome: Ongoing  Note: Pt observed to be restless, feet moving from side to side, pt shifting his position in his bed while laying down.  Medicated with prn Atarax see MAR     Problem: Discharge Planning:  Goal: Discharged to appropriate level of care  Description: Discharged to appropriate level of care  1/5/2022 2124 by Edmundo Garcia LPN  Outcome: Ongoing  Note:   Pt working with treatment team to explore a healthy environment that support recovery and total abstinence from mood altering addictive substances. 1/5/2022 1102 by Jenifer Carlisle RN  Outcome: Ongoing  Note: Pt working with treatment team to explore a healthy environment that support recovery and total abstinence from mood altering addictive substances. Problem: Suicide risk  Goal: Provide patient with safe environment  Description: Provide patient with safe environment  1/5/2022 2124 by Edmundo Garcia LPN  Outcome: Ongoing  Note: Pt has been provided with a safe environment, pt remains on every 15 minute checks for safety and reassurance. 1/5/2022 1102 by Jenifer Carlisle RN  Outcome: Ongoing  Note: Safe environment maintained pt continues with visual 15 min checks as ordered for pt's safety     Problem: Coping:  Goal: Ability to identify problematic behaviors that deter socialization will improve  Description: Ability to identify problematic behaviors that deter socialization will improve  1/5/2022 2124 by Edmundo Garcia LPN  Outcome: Ongoing  1/5/2022 1459 by Jarad Ocasio  Outcome: Not Met This Shift  1/5/2022 1102 by Jenifer Carlisle RN  Outcome: Ongoing     Problem: Anxiety:  Goal: Level of anxiety will decrease  Description: Level of anxiety will decrease  1/5/2022 2124 by Edmundo Garcia LPN  Outcome: Ongoing  1/5/2022 1102 by Jenifer Carlisle RN  Outcome: Ongoing  Note: Anxiety continues pt provided with education on benefits and purpose of accepting antianxiety medications. We discussed the importance of letting the mind/body rest in early recovery. Pt began to tear up discussing the negative environment he resides at and his feeling of hopelessness     Care plan reviewed with patient and does verbalize understanding of the plan of care and contribute to goal setting.

## 2022-01-06 NOTE — BH NOTE
Pt reports he does not wish to return to the [facility]3], states he will \"just use again\" due to the environment.  Pt would like to speak to  to see if he has another option

## 2022-01-06 NOTE — PROGRESS NOTES
[address] [place] [address]  Day 3 Interdisciplinary Treatment Plan NOTE    Review Date & Time: 1/6/22  1300    Patient was in treatment team    Admission Type:   Admission Type:  Involuntary    Reason for admission:  Reason for Admission: suicidal thoughts  Estimated Length of Stay Update:  1/10/22  Estimated Discharge Date Update: 1-3 days    PATIENT STRENGTHS:  Patient Strengths Strengths: No significant Physical Illness,Communication  Patient Strengths and Limitations:Limitations: Difficulty problem solving/relies on others to help solve problems,Inappropriate/potentially harmful leisure interests  Addictive Behavior:Addictive Behavior  In the past 3 months, have you felt or has someone told you that you have a problem with:  : None  Do you have a history of Chemical Use?: No  Do you have a history of Alcohol Use?: Yes  Do you have a history of Street Drug Abuse?: Yes  Histroy of Prescripton Drug Abuse?: No  Medical Problems:  Past Medical History:   Diagnosis Date    Psychiatric problem        Risk:  Fall RiskTotal: 75  Maximo Scale Maximo Scale Score: 22  BVC        Status EXAM:   Status and Exam  Normal: No  Facial Expression: Flat,Worried  Affect: Blunt  Level of Consciousness: Alert  Mood:Normal: No  Mood: Depressed,Anxious,Sad  Motor Activity:Normal: Yes  Interview Behavior: Cooperative  Preception: [place] to Person,[place] to Time,[place] to Place,[place] to Situation  Attention:Normal: Yes  Thought Processes: Circumstantial  Thought Content:Normal: Yes  Hallucinations: None  Delusions: No  Memory:Normal: Yes  Insight and Judgment: No  Insight and Judgment: Poor Judgment,Poor Insight  Present Suicidal Ideation: No  Present Homicidal Ideation: No    Daily Assessment Last Entry:   Daily Sleep (WDL): Within Defined Limits         Patient Currently in Pain: Denies  Daily Nutrition (WDL): Within Defined Limits  Appetite Change: Decreased  Barriers to Nutrition: None  Level of Assistance: Independent/Self    Patient Monitoring:  Frequency of Checks: 4 times per hour, close    Psychiatric Symptoms:   Depression Symptoms  Depression Symptoms: Loss of interest,Isolative  Anxiety Symptoms  Anxiety Symptoms: Generalized  Monica Symptoms  Monica Symptoms: No problems reported or observed. Psychosis Symptoms  Delusion Type: No problems reported or observed. Suicide Risk CSSR-S:  1) Within the past month, have you wished you were dead or wished you could go to sleep and not wake up? : Yes  2) Have you actually had any thoughts of killing yourself? : Yes  3) Have you been thinking about how you might kill yourself? : No  5) Have you started to work out or worked out the details of how to kill yourself? Do you intend to carry out this plan? : No  6) Have you ever done anything, started to do anything, or prepared to do anything to end your life?: Yes        EDUCATION:   Learner Progress Toward Treatment Goals: Reviewed results and recommendations of this team, Reviewed group plan and strategies, Reviewed signs, symptoms and risk of self harm and violent behavior and Reviewed goals and plan of care    Method: Individual    Outcome: Verbalized understanding, Demonstrated Understanding and Needs reinforcement    PATIENT GOALS: Help for my mental health     PLAN/TREATMENT RECOMMENDATIONS UPDATE:  1. How are you progressing toward meeting your main treatment goal? Pt is very anxious today, fearful that he may return to [facility]. Pt has 6 months deferred as a part of his re-entry. 2.  Are there discharge barriers/lingering problems that need to be addressed? Walk in at [facility]      3. Do you have the ability to pay for your medications? Medicaid      4. How is your group participation? None. Isolative to his room.      GOALS UPDATE:   Time frame for Short-Term Goals: Daily      Laurel Price, Robe Ramirez [phone]

## 2022-01-06 NOTE — PROGRESS NOTES
Case Management [id number] telephoned the [facility]. Pt is able to return to this facility. Also, pt is required to return to this facility as a condition of his [facility]entry [facility] the [facility].

## 2022-01-06 NOTE — BH NOTE
Group Therapy Note    Date: 1/5/2022  Start Time: 2000  End Time:  2020      Type of Group: Wrap-Up        Patient's Goal:  Find another place to go other than [facility] upon discharge     Notes:  ongoing    Participation Level: Minimal    Participation Quality: Appropriate      Speech:  normal      Thought Process/Content: Linear      Affective Functioning: Flat      Mood: anxious and depressed      Level of consciousness:  Alert and Oriented x4      Response to Learning: Able to verbalize current knowledge/experience and Able to retain information        Discipline Responsible: Licensed Practical Nurse      Signature:  Carlos Hopper LPN

## 2022-01-06 NOTE — PROGRESS NOTES
This RN has reviewed and agrees with AKBAR Garcia LPN's data collection and has collaborated with this LPN regarding the patient's care plan.

## 2022-01-07 VITALS
SYSTOLIC BLOOD PRESSURE: 97 MMHG | HEIGHT: 72 IN | TEMPERATURE: 97 F | BODY MASS INDEX: 27.09 KG/M2 | RESPIRATION RATE: 16 BRPM | DIASTOLIC BLOOD PRESSURE: 57 MMHG | OXYGEN SATURATION: 96 % | HEART RATE: 76 BPM | WEIGHT: 200 LBS

## 2022-01-07 PROCEDURE — 5130000000 HC BRIDGE APPOINTMENT

## 2022-01-07 PROCEDURE — 99239 HOSP IP/OBS DSCHRG MGMT >30: CPT | Performed by: PSYCHIATRY & NEUROLOGY

## 2022-01-07 RX ORDER — MIRTAZAPINE 7.5 MG/1
7.5 TABLET, FILM COATED ORAL NIGHTLY
Qty: 30 TABLET | Refills: 0 | Status: SHIPPED | OUTPATIENT
Start: 2022-01-07

## 2022-01-07 RX ORDER — TRAZODONE HYDROCHLORIDE 50 MG/1
50 TABLET ORAL NIGHTLY PRN
Qty: 30 TABLET | Refills: 0 | Status: SHIPPED | OUTPATIENT
Start: 2022-01-07

## 2022-01-07 RX ORDER — HYDROXYZINE 50 MG/1
50 TABLET, FILM COATED ORAL 3 TIMES DAILY PRN
Qty: 90 TABLET | Refills: 0 | Status: SHIPPED | OUTPATIENT
Start: 2022-01-07 | End: 2022-02-06

## 2022-01-07 ASSESSMENT — PAIN SCALES - GENERAL: PAINLEVEL_OUTOF10: 0

## 2022-01-07 NOTE — DISCHARGE SUMMARY
specific plan or intent to harm himself. When asked if anything brought the suicidal thoughts on, he says he has been doing methamphetamine daily for over a week. He reports he recently got out of [facility] 10 days ago after serving a 13-month sentence for possession of controlled substance. Patient states he has been feeling depressed for the last year. When asked what brought his depression on at that time, he says he was in [facility]. He denies feeling down and sad for more days than not. He reports he has been sleeping okay at home. His appetite is been good. Reports his energy and motivation have been good throughout the day. Denies any issues with attention or concentration. He has been feeling hopeless and helpless at times. Patient initially denies any hallucinations prior to admission. When asked about the command hallucinations to harm himself he reported in the emergency department on 1/2, he does report he was having command auditory hallucinations to harm himself that night. He denies having any since then.  Bryan Taylor is seen lying in bed. He is somnolent but cooperative with the interview. Staff reported he did not sleep at all last night. He reports he is feeling depressed today. He continues to endorse passive suicidal ideation without plan or intent. He denies any active suicidal ideation at this time. He is able to contract for safety on the unit. Denies any auditory or visual hallucinations so far today. He endorses feeling safe on the unit and denies any paranoia at this time. Patient reported that he is withdrawing from methamphetamine. He is restless and anxious. Hospital Course:   Upon admission, Bhavani Replaced by [facility] was provided a safe secure environment, introduced to unit milieu. Patient participated in groups and individual therapies. Meds were adjusted as noted below. After few days of hospital care, patient began to feel improvement. Depression lifted, thoughts to harm self ceased. activity as tolerated  1. Patient instructed to take medications regularly and follow up with outpatient appointments. Follow-up as scheduled with [facility]       Signed:    Electronically signed by Shaun River MD on 1/7/22 at 1:07 PM EST    Time Spent on discharge is more than 33 minutes in the examination, evaluation, counseling and review of medications and discharge plan. Patient is evaluated by Simi NOLASCO on the unit in person and I evaluated patient as Tele visit. Juliet Osullivan is a 36 y.o. male being evaluated by a Virtual Visit (video visit) encounter to address concerns as mentioned above. A caregiver was present in the room along with the patient. Patient is present at [address] [place] [address], [address] [place] [address] and I am physically present at [facility]    --Shaun River MD on 1/7/2022 at 1:07 PM    An electronic signature was used to authenticate this note. **This report has been created using voice recognition software. It may contain minor errors which are inherent in voice recognition technology. **

## 2022-01-07 NOTE — PLAN OF CARE
Problem: Depressive Behavior With or Without Suicide Precautions:  Goal: Able to verbalize and/or display a decrease in depressive symptoms  Description: Able to verbalize and/or display a decrease in depressive symptoms  Outcome: Not Met This Shift  Note: Continues to be depressed. Rates his mood a 5 with 10 being the best. Affect flat, irritable. Good eye contact. States he is a little hope for the future. On fringe with peers but mostly isolated to bed. Goal: Able to verbalize support systems  Description: Able to verbalize support systems  Outcome: Not Met This Shift  Note: Denies having any support system. Goal: Participates in care planning  Description: Participates in care planning  Outcome: Ongoing  Note: Care plan reviewed with patient and verbalize understanding of the plan of care and does not contribute to goal setting. Problem: Substance Abuse:  Goal: Absence of drug withdrawal signs and symptoms  Description: Absence of drug withdrawal signs and symptoms  Outcome: Met This Shift  Note: Denies any withdrawal signs and symptoms      Problem: Discharge Planning:  Goal: Discharged to appropriate level of care  Description: Discharged to appropriate level of care  Outcome: Ongoing  Note: Discharge planning in progress. Problem: Suicide risk  Goal: Provide patient with safe environment  Description: Provide patient with safe environment  Outcome: Met This Shift  Note: Pt remains safe and free from harm. 15 minute safety checks are being completed throughout shift. Problem: Coping:  Goal: Ability to identify problematic behaviors that deter socialization will improve  Description: Ability to identify problematic behaviors that deter socialization will improve  Outcome: Ongoing     Problem: Anxiety:  Goal: Level of anxiety will decrease  Description: Level of anxiety will decrease  Outcome: Not Met This Shift  Note: Continues to be anxious refused to take atarax this shift. Problem: Activity:  Goal: Sleeping patterns will improve  Description: Sleeping patterns will improve  Outcome: Ongoing  Note: Slept 8 hours last evening requested trazodone this shift.

## 2022-01-10 ENCOUNTER — TELEPHONE (OUTPATIENT)
Dept: PSYCHIATRY | Age: 32
End: 2022-01-10

## 2023-01-30 ENCOUNTER — CLINICAL DOCUMENTATION (OUTPATIENT)
Dept: INTERNAL MEDICINE CLINIC | Age: 33
End: 2023-01-30

## 2023-01-30 NOTE — PROGRESS NOTES
Patient contacted office for a new patient screening. Patient reports that he was with [facility] though something went wrong with his insurance and the provider wasn't able to continue seeing him. He was on 3-8mg daily. Currently splitting in pieces. Reports that he has relapsed using 1/2 gram daily, last time use 4 days ago. Patient reports that he was recently at the [facility]  and per chart he has been in the E/d and admitted to  a few times at the beginning of the month.     Gena staffed with Ermias Mattson CNP and agreed to see him on 1/31/2023@

## 2023-01-31 ENCOUNTER — OFFICE VISIT (OUTPATIENT)
Dept: INTERNAL MEDICINE CLINIC | Age: 33
End: 2023-01-31

## 2023-01-31 VITALS
HEIGHT: 72 IN | SYSTOLIC BLOOD PRESSURE: 153 MMHG | DIASTOLIC BLOOD PRESSURE: 98 MMHG | BODY MASS INDEX: 22.35 KG/M2 | WEIGHT: 165 LBS | HEART RATE: 89 BPM

## 2023-01-31 DIAGNOSIS — Z11.4 SCREENING FOR HIV (HUMAN IMMUNODEFICIENCY VIRUS): ICD-10-CM

## 2023-01-31 DIAGNOSIS — Z91.89 ENCOUNTER FOR HCV SCREENING TEST FOR HIGH RISK PATIENT: ICD-10-CM

## 2023-01-31 DIAGNOSIS — F11.20 SEVERE OPIOID USE DISORDER (HCC): Primary | ICD-10-CM

## 2023-01-31 DIAGNOSIS — Z11.59 ENCOUNTER FOR HCV SCREENING TEST FOR HIGH RISK PATIENT: ICD-10-CM

## 2023-01-31 LAB
ALCOHOL URINE: ABNORMAL
AMPHETAMINE SCREEN, URINE: ABNORMAL
BARBITURATE SCREEN, URINE: ABNORMAL
BENZODIAZEPINE SCREEN, URINE: ABNORMAL
BUPRENORPHINE URINE: ABNORMAL
COCAINE METABOLITE SCREEN URINE: ABNORMAL
FENTANYL SCREEN, URINE: ABNORMAL
GABAPENTIN SCREEN, URINE: ABNORMAL
MDMA URINE: ABNORMAL
METHADONE SCREEN, URINE: ABNORMAL
METHAMPHETAMINE, URINE: ABNORMAL
OPIATE SCREEN URINE: ABNORMAL
OXYCODONE SCREEN URINE: ABNORMAL
PHENCYCLIDINE SCREEN URINE: ABNORMAL
PROPOXYPHENE SCREEN, URINE: ABNORMAL
SYNTHETIC CANNABINOIDS(K2) SCREEN, URINE: ABNORMAL
THC SCREEN, URINE: ABNORMAL
TRAMADOL SCREEN URINE: ABNORMAL
TRICYCLIC ANTIDEPRESSANTS, UR: ABNORMAL

## 2023-01-31 RX ORDER — BUPRENORPHINE AND NALOXONE 8; 2 MG/1; MG/1
1 FILM, SOLUBLE BUCCAL; SUBLINGUAL 3 TIMES DAILY
COMMUNITY

## 2023-01-31 RX ORDER — ESCITALOPRAM OXALATE 5 MG/1
5 TABLET ORAL DAILY
COMMUNITY

## 2023-01-31 RX ORDER — BUPRENORPHINE AND NALOXONE 8; 2 MG/1; MG/1
1 FILM, SOLUBLE BUCCAL; SUBLINGUAL 2 TIMES DAILY
Qty: 12 FILM | Refills: 0 | Status: SHIPPED | OUTPATIENT
Start: 2023-01-31 | End: 2023-02-06

## 2023-01-31 ASSESSMENT — PATIENT HEALTH QUESTIONNAIRE - PHQ9
2. FEELING DOWN, DEPRESSED OR HOPELESS: 1
SUM OF ALL RESPONSES TO PHQ QUESTIONS 1-9: 2
1. LITTLE INTEREST OR PLEASURE IN DOING THINGS: 1
SUM OF ALL RESPONSES TO PHQ QUESTIONS 1-9: 2
SUM OF ALL RESPONSES TO PHQ9 QUESTIONS 1 & 2: 2

## 2023-01-31 NOTE — PROGRESS NOTES
01/31/23   The patients primary provider is On File Not (Inactive)    Maxi Fisher is a 34 y.o.  male who presents in office today for medication assisted treatment. The patient starting smoking marijuana when he was 17. Patient reports that advance from there. He began using methamphetamine and fentanyl around the age of 34   Quantity used daily: Half a gram  Route of administration: Snorting and injection  Date and time of last use: Approximately 2 weeks ago  Othersubstances: THC  Prior uses of buprenorphine/naltrexone: Reports he was taking 24 mg daily. Medication through the Suboxone clinic in [address] [place] [address] history: anxiety-  has been on depakote, seroquel, clonopin, lexapro, buspirone, and several others. Sleeps well-no concerns  Social hx-he is currently living with his mother. This is a sober environment. They reside in [place]. Patient states his family is very supportive. He is employed third shift, full-time,  Patient states he was released from [facility] June 13, 2022 after serving 2 years for aggravated possession    Hepatitis hx: denies known history  Previous labs reviewed, greater than a year ago. Pt agrees to repeat blood work    Discussed at length all Medication Assisted Treatment options. Patient wishes to proceed with treatment of buprenorphine at this time. I allowed opportunity to respond to questions regarding treatment options. Pt would like to start treatment with suboxone. Patient instructed to avoid cannabis, stimulants, and other addictive drugs. The use of benzodiazepines and other sedative hypnotics combined with buprenorphine increases the risk of serious side effects including overdose. Harm for untreated opioid use disorder does outweigh the risks    Education was given on the importance of combining medication assisted treatment with comprehensive treatment.   This includes individual counseling, treatment groups, community support groups, and psychiatry as applicable. Patient will meet with the  to review clinic counseling expectations and be linked to appropriate services. Reviewed medication contract with the patient. Importance of medication adherence discussed. Patient is agreeable to the program expectations. Both patient and provider signed medication contract. Patient instructed to go to [address] [place] [address] to watch a video and learn about [facility]. I told patient [facility] is an opioid antagonist that reverses respiratory depression caused by opioids. Pharmacy will give patient or family member [facility] and explain how to use in an emergency.     Past Medical History:   Diagnosis Date    Psychiatric problem          Social History     Socioeconomic History    Marital status: Single     Spouse name: Not on file    Number of children: Not on file    Years of education: Not on file    Highest education level: Not on file   Occupational History    Not on file   Tobacco Use    Smoking status: Every Day     Packs/day: 0.50     Types: Cigarettes    Smokeless tobacco: Never   Vaping Use    Vaping Use: Never used   Substance and Sexual Activity    Alcohol use: Not Currently    Drug use: Yes     Types: Methamphetamines (Crystal Meth), Marijuana (Dola Cosier), Fentanyl     Comment: last used fentanyl 1/21/23    Sexual activity: Not Currently   Other Topics Concern    Not on file   Social History Narrative    Not on file     Social Determinants of Health     Financial Resource Strain: Not on file   Food Insecurity: Not on file   Transportation Needs: Not on file   Physical Activity: Not on file   Stress: Not on file   Social Connections: Not on file   Intimate Partner Violence: Not on file   Housing Stability: Not on file         Current Outpatient Medications on File Prior to Visit   Medication Sig Dispense Refill    escitalopram (LEXAPRO) 5 MG tablet Take 5 mg by mouth daily      buprenorphine-naloxone (SUBOXONE) 8-2 MG FILM SL film Place 1 Film under the tongue in the morning, at noon, and at bedtime. No current facility-administered medications on file prior to visit. Review of Systems   Constitutional: Negative. Respiratory:  Negative for cough, chest tightness, shortness of breath and wheezing. Cardiovascular:  Negative for chest pain and palpitations. Gastrointestinal:  Negative for abdominal pain, constipation, diarrhea, nausea and vomiting. Musculoskeletal:  Negative for arthralgias, gait problem and myalgias. Neurological:  Negative for dizziness, tremors, syncope, speech difficulty, weakness and headaches. Hematological: Negative. Psychiatric/Behavioral: Negative. Physical Exam  Constitutional:       Appearance: Normal appearance. HENT:      Head: Normocephalic. Eyes:      Pupils: Pupils are equal, round, and reactive to light. Cardiovascular:      Rate and Rhythm: Normal rate and regular rhythm. Pulmonary:      Effort: Pulmonary effort is normal.      Breath sounds: Normal breath sounds. Musculoskeletal:         General: Normal range of motion. Cervical back: Normal range of motion. Skin:     General: Skin is warm and dry. Capillary Refill: Capillary refill takes 2 to 3 seconds. Neurological:      General: No focal deficit present. Mental Status: He is alert and oriented to person, place, and time. Psychiatric:         Mood and Affect: Mood normal.         Behavior: Behavior normal.         Thought Content:  Thought content normal.         Judgment: Judgment normal.       Vitals:    01/31/23 1419   BP: (!) 153/98   Pulse: 89        Patient Active Problem List   Diagnosis    Acute psychosis ([facility] [address]       PDMP Monitoring:    Last PDMP [place] as Reviewed [facility]):  Review User Review Instant Review Result   Todd Shukla 1/31/2023  2:51 PM Reviewed PDMP [1]         I reviewed the [place] Automated Rx Reporting System report     There does not appear to be any discrepancies or overprescribing of controlled substances      GOAL:  To enhance patient recovery through the use of medication assisted treatment to improve overall quality of life. OBJECTIVE:  Patient will abstain from the use of mood altering substances 7 out of 7 days per week. INTERVENTION:  Patient will be maintained on suboxone medication and will be linked to counseling, psychiatry and 12 step meetings as applicable. Patient will continue current treatment regiment as outlined and treatment plan will be reviewed at each visit. Discussed importance of attending sober meetings/support. Recommended use of Reseto. Referral to Nathaniel Akers for additional resources.      Plan:   Orders Placed This Encounter   Procedures    POCT Rapid Drug Screen        CALDERON Marcelo CNP 01/31/23 2:54 PM

## 2023-01-31 NOTE — PROGRESS NOTES
Verbal order per Julianna Dunn CNP for urine drug screen. Positive for BUP THC. Verified results with Jacqui MORENO LPN.

## 2023-02-01 RX ORDER — NALOXONE HYDROCHLORIDE 4 MG/.1ML
1 SPRAY NASAL PRN
Qty: 1 EACH | Refills: 1 | Status: SHIPPED | OUTPATIENT
Start: 2023-02-01 | End: 2023-02-02

## 2023-02-01 RX ORDER — NALOXONE HYDROCHLORIDE 4 MG/.1ML
1 SPRAY NASAL PRN
Qty: 1 EACH | Refills: 1 | Status: SHIPPED | OUTPATIENT
Start: 2023-02-01 | End: 2024-02-01

## 2023-02-01 ASSESSMENT — ENCOUNTER SYMPTOMS
NAUSEA: 0
DIARRHEA: 0
CONSTIPATION: 0
COUGH: 0
CHEST TIGHTNESS: 0
VOMITING: 0
WHEEZING: 0
SHORTNESS OF BREATH: 0
ABDOMINAL PAIN: 0

## 2023-02-06 ENCOUNTER — OFFICE VISIT (OUTPATIENT)
Dept: INTERNAL MEDICINE CLINIC | Age: 33
End: 2023-02-06
Payer: MEDICAID

## 2023-02-06 ENCOUNTER — NURSE ONLY (OUTPATIENT)
Dept: LAB | Age: 33
End: 2023-02-06

## 2023-02-06 VITALS
DIASTOLIC BLOOD PRESSURE: 73 MMHG | BODY MASS INDEX: 22.26 KG/M2 | HEART RATE: 107 BPM | HEIGHT: 73 IN | WEIGHT: 168 LBS | SYSTOLIC BLOOD PRESSURE: 140 MMHG

## 2023-02-06 DIAGNOSIS — Z11.59 ENCOUNTER FOR HCV SCREENING TEST FOR HIGH RISK PATIENT: ICD-10-CM

## 2023-02-06 DIAGNOSIS — F11.20 SEVERE OPIOID USE DISORDER (HCC): Primary | ICD-10-CM

## 2023-02-06 DIAGNOSIS — F11.20 SEVERE OPIOID USE DISORDER (HCC): ICD-10-CM

## 2023-02-06 DIAGNOSIS — Z11.4 SCREENING FOR HIV (HUMAN IMMUNODEFICIENCY VIRUS): ICD-10-CM

## 2023-02-06 DIAGNOSIS — Z91.89 ENCOUNTER FOR HCV SCREENING TEST FOR HIGH RISK PATIENT: ICD-10-CM

## 2023-02-06 PROCEDURE — 99211 OFF/OP EST MAY X REQ PHY/QHP: CPT | Performed by: NURSE PRACTITIONER

## 2023-02-06 PROCEDURE — 80305 DRUG TEST PRSMV DIR OPT OBS: CPT | Performed by: NURSE PRACTITIONER

## 2023-02-06 RX ORDER — BUPRENORPHINE AND NALOXONE 8; 2 MG/1; MG/1
1 FILM, SOLUBLE BUCCAL; SUBLINGUAL 2 TIMES DAILY
Qty: 12 FILM | Refills: 0 | Status: CANCELLED | OUTPATIENT
Start: 2023-02-06 | End: 2023-02-12

## 2023-02-06 NOTE — PROGRESS NOTES
Verbal order per Baylee Davison CNP for urine drug screen. Positive for BUP THC. Verified results with Jacqui MORENO LPN.

## 2023-02-06 NOTE — PROGRESS NOTES
02/06/23   The patients primary care physician is On File Not (Inactive)    Rogerio Osborne is a 34 y.o.  male who presents in office today for follow up medication assisted treatment, substance use disorder. Established care 1/31/2023    Pt denies any urges, triggers, or cravings. UDS Positive for BUP THC    Pt is attending sober meetings and counseling through ***    Hepatitis- pending results    Pertinent Drug History  The patient starting smoking marijuana when he was 17. Patient reports that advance from there. He began using methamphetamine and fentanyl around the age of 34   Quantity used daily: Half a gram  Route of administration: Snorting and injection  Prior uses of buprenorphine/naltrexone: Reports he was taking 24 mg daily. Medication through the Suboxone clinic in [address] [place] [address] history: anxiety-  has been on depakote, seroquel, clonopin, lexapro, buspirone, and several others. Sleeps well-no concerns  Social hx-he is currently living with his mother. This is a sober environment. They reside in [place]. Patient states his family is very supportive.   He is employed third shift, full-time,  Patient states he was released from [facility] June 13, 2022 after serving 2 years for aggravated possession        Social History     Socioeconomic History    Marital status: Single     Spouse name: Not on file    Number of children: Not on file    Years of education: Not on file    Highest education level: Not on file   Occupational History    Not on file   Tobacco Use    Smoking status: Every Day     Packs/day: 0.50     Types: Cigarettes    Smokeless tobacco: Never   Vaping Use    Vaping Use: Never used   Substance and Sexual Activity    Alcohol use: Not Currently    Drug use: Yes     Types: Methamphetamines (Crystal Meth), Marijuana (Susan Quintin), Fentanyl     Comment: last used fentanyl 1/21/23    Sexual activity: Not Currently   Other Topics Concern    Not on file Social History Narrative    Not on file     Social Determinants of Health     Financial Resource Strain: Not on file   Food Insecurity: Not on file   Transportation Needs: Not on file   Physical Activity: Not on file   Stress: Not on file   Social Connections: Not on file   Intimate Partner Violence: Not on file   Housing Stability: Not on file         Current Outpatient Medications on File Prior to Visit   Medication Sig Dispense Refill    buprenorphine-naloxone (SUBOXONE) 8-2 MG FILM SL film Place 1 Film under the tongue in the morning and at bedtime for 6 days. Max Daily Amount: 2 Film 12 Film 0    naloxone 4 MG/0.1ML LIQD nasal spray 1 spray by Nasal route as needed for Opioid Reversal 1 each 1    escitalopram (LEXAPRO) 5 MG tablet Take 5 mg by mouth daily      buprenorphine-naloxone (SUBOXONE) 8-2 MG FILM SL film Place 1 Film under the tongue in the morning, at noon, and at bedtime. No current facility-administered medications on file prior to visit.          Vitals:    02/06/23 1430   BP: (!) 140/73   Pulse: (!) 107        Cognition: alert, oriented to person, place, and time  Appearance: appropriate, no acute distress, does not appear intoxicated or in withdrawal  Memory: Normal  Behavioral/motor: normal  Affect: congruent  Attitude toward examiner: respectful, pleasant  Thought content: no delusions, hallucination, Denies suicidal ideation or intent  Insight: fair  Judgement: fair  Eyes: pupils normal  Skin: no rashes, no track marks noted        Patient Active Problem List   Diagnosis    Acute psychosis ([facility] [address]       PDMP Monitoring:    Last PDMP Mani as Reviewed [facility]):  Review User Review Instant Review Result   Ming CHEW 1/31/2023  3:00 PM Reviewed PDMP [1]           I reviewed the [place] Automated Rx Reporting System report     There does not appear to be any discrepancies or overprescribing of controlled substances    GOAL:  To enhance patient recovery through the use of medication assisted treatment to improve overall quality of life. OBJECTIVE:  Patient will abstain from the use of mood altering substances 7 out of 7 days per week. INTERVENTION:  Patient will be maintained on suboxone medication.   The importance of combining medical assisted treatment with comprehensive treatment including counseling, support groups, and psychiatry as applicable was discussed with patient      Orders Placed This Encounter   Procedures    POCT Rapid Drug Screen        Julie Schaumann, APRN - CNP 02/06/23 2:51 PM

## 2023-02-07 NOTE — PROGRESS NOTES
Pt left prior to speaking with this provider. Staff report he was aware and instructed to wait in room. Staff attempted to call pt and instruct to return to office.  Phone number in chart was no longer in service